# Patient Record
Sex: FEMALE | Race: WHITE | Employment: UNEMPLOYED | URBAN - METROPOLITAN AREA
[De-identification: names, ages, dates, MRNs, and addresses within clinical notes are randomized per-mention and may not be internally consistent; named-entity substitution may affect disease eponyms.]

---

## 2017-10-17 ENCOUNTER — OP HISTORICAL/CONVERTED ENCOUNTER (OUTPATIENT)
Dept: OTHER | Age: 32
End: 2017-10-17

## 2018-02-12 ENCOUNTER — OP HISTORICAL/CONVERTED ENCOUNTER (OUTPATIENT)
Dept: OTHER | Age: 33
End: 2018-02-12

## 2018-02-23 ENCOUNTER — OP HISTORICAL/CONVERTED ENCOUNTER (OUTPATIENT)
Dept: OTHER | Age: 33
End: 2018-02-23

## 2018-03-02 ENCOUNTER — OP HISTORICAL/CONVERTED ENCOUNTER (OUTPATIENT)
Dept: OTHER | Age: 33
End: 2018-03-02

## 2018-04-02 ENCOUNTER — OP HISTORICAL/CONVERTED ENCOUNTER (OUTPATIENT)
Dept: OTHER | Age: 33
End: 2018-04-02

## 2018-05-05 ENCOUNTER — IP HISTORICAL/CONVERTED ENCOUNTER (OUTPATIENT)
Dept: OTHER | Age: 33
End: 2018-05-05

## 2019-06-16 ENCOUNTER — ED HISTORICAL/CONVERTED ENCOUNTER (OUTPATIENT)
Dept: OTHER | Age: 34
End: 2019-06-16

## 2019-12-08 ENCOUNTER — IP HISTORICAL/CONVERTED ENCOUNTER (OUTPATIENT)
Dept: OTHER | Age: 34
End: 2019-12-08

## 2020-01-31 ENCOUNTER — OP HISTORICAL/CONVERTED ENCOUNTER (OUTPATIENT)
Dept: OTHER | Age: 35
End: 2020-01-31

## 2020-06-03 ENCOUNTER — OP HISTORICAL/CONVERTED ENCOUNTER (OUTPATIENT)
Dept: OTHER | Age: 35
End: 2020-06-03

## 2020-07-01 ENCOUNTER — OP HISTORICAL/CONVERTED ENCOUNTER (OUTPATIENT)
Dept: OTHER | Age: 35
End: 2020-07-01

## 2020-07-02 PROBLEM — R94.6 THYROID FUNCTION TEST ABNORMAL: Status: ACTIVE | Noted: 2020-07-02

## 2020-07-02 PROBLEM — R53.83 FATIGUE: Status: ACTIVE | Noted: 2020-07-02

## 2020-07-02 PROBLEM — E05.90 SUBCLINICAL HYPERTHYROIDISM: Status: ACTIVE | Noted: 2020-07-02

## 2020-07-02 RX ORDER — METHIMAZOLE 5 MG/1
TABLET ORAL
COMMUNITY
End: 2020-07-23 | Stop reason: SDUPTHER

## 2020-07-02 RX ORDER — FLUCONAZOLE 150 MG/1
150 TABLET ORAL DAILY
COMMUNITY
End: 2020-07-23 | Stop reason: ALTCHOICE

## 2020-07-02 RX ORDER — ONDANSETRON 4 MG/1
4 TABLET, FILM COATED ORAL
COMMUNITY
End: 2020-07-23 | Stop reason: ALTCHOICE

## 2020-07-02 RX ORDER — OSELTAMIVIR PHOSPHATE 75 MG/1
CAPSULE ORAL
COMMUNITY
End: 2020-07-23 | Stop reason: ALTCHOICE

## 2020-07-02 RX ORDER — IBUPROFEN 200 MG
TABLET ORAL
COMMUNITY
End: 2020-07-23 | Stop reason: ALTCHOICE

## 2020-07-02 RX ORDER — CYCLOBENZAPRINE HCL 10 MG
TABLET ORAL
COMMUNITY
End: 2020-07-23 | Stop reason: ALTCHOICE

## 2020-07-23 ENCOUNTER — VIRTUAL VISIT (OUTPATIENT)
Dept: ENDOCRINOLOGY | Age: 35
End: 2020-07-23
Payer: MEDICAID

## 2020-07-23 DIAGNOSIS — E05.90 HYPERTHYROIDISM: Primary | ICD-10-CM

## 2020-07-23 PROBLEM — R94.6 THYROID FUNCTION TEST ABNORMAL: Status: RESOLVED | Noted: 2020-07-02 | Resolved: 2020-07-23

## 2020-07-23 PROCEDURE — 99213 OFFICE O/P EST LOW 20 MIN: CPT | Performed by: INTERNAL MEDICINE

## 2020-07-23 RX ORDER — METHIMAZOLE 5 MG/1
5 TABLET ORAL DAILY
Qty: 30 TAB | Refills: 4 | Status: SHIPPED | OUTPATIENT
Start: 2020-07-23 | End: 2020-08-22

## 2020-07-23 NOTE — PROGRESS NOTES
History and Physical    Patient: German Pérez MRN: [id number]  SSN: [id number]    YOB: 1985  Age: 30 y.o. Sex: female      Subjective:    German Pérez, who was evaluated through a synchronous (real-time) audio-video encounter, and/or her healthcare decision maker, is aware that it is a billable service, with coverage as determined by her insurance carrier. She provided verbal consent to proceed: Yes, and patient identification was verified. It was conducted pursuant to the emergency declaration under the [facility], [address] [place] [address] authority and the [employer] and [employer] Act. A caregiver was present when appropriate. Ability to conduct physical exam was limited. I was in the office. The patient was at home. German Pérez is a 30 y.o. female  with no significant past medical history is here for follow-up of abnormal thyroid function test/subclinical hyperthyroidism. She was initially sent to me by Saleem Ferraro Grandchild. She does not have a primary care physician. Patient is almost 7 months postpartum. After the initial visit patient had complete thyroid function test which continued to show subclinical hyperthyroidism. Thyroid ultrasound showed some subcentimeter nodules and uptake scan showed increased uptake but there was no clear hot or cold nodule. At the last visit she was started on methimazole 5 mg daily. She is taking this regularly and tolerating it well. She had labs done prior to this visit. Symptom wise, she is feeling better in terms of improved appetite, not as fatigued as before, does not have palpitations anymore.     Status post bilateral tubal ligation     Initial history:  When she went to OB/GYN she had complaints including decreased appetite, difficulty in maintaining her weight, she has been around 125 pounds or her life but it has been difficult for her to maintain her weight because she does not feel hungry. She has chronic fatigue, cold intolerance, hair fall. She has intermittent palpitations which she never had evaluated. Bowel movements are regular. Since she is postpartum, menses are still not regular. She is currently not breast-feeding, but she was able to breast-feed for first 5 to 6 weeks. Skin is dry. She does not use any over-the-counter medications or supplements. She is also experiencing some moodiness. Symptoms: As above    Prior history of thyroid problems: no    Recent steroid treatments: no    High dose Biotin supplemnts: no    Recent URI: Flu in 12/2019    Tenderness in neck: no    Swelling in neck: no    Family history of thyroid problems: no    Personal/family history of autoimmune diseases: no    smoking: yes    Change in appearance of eyes/ redness/ eye irritation: dry eyes and swollen that she attributes to allergies    Personal history of cardiac disease: no    Personal history of osteoporosis/fragility fractures: no    Past Medical History:   Diagnosis Date    Fatigue 7/2/2020    Subclinical hyperthyroidism 7/2/2020    Thyroid function test abnormal 7/2/2020     Past Surgical History:   Procedure Laterality Date    HX TUBAL LIGATION        Family History   Problem Relation Age of Onset    Hypertension Father      Social History     Tobacco Use    Smoking status: Current Every Day Smoker     Packs/day: 0.25    Smokeless tobacco: Never Used   Substance Use Topics    Alcohol use: Not Currently      Prior to Admission medications    Medication Sig Start Date End Date Taking? Authorizing Provider   cyclobenzaprine (FLEXERIL) 10 mg tablet Take  by mouth three (3) times daily as needed for Muscle Spasm(s). Provider, Historical   fluconazole (DIFLUCAN) 150 mg tablet Take 150 mg by mouth daily. FDA advises cautious prescribing of oral fluconazole in pregnancy. Provider, Historical   ibuprofen (MOTRIN) 200 mg tablet Take  by mouth. Provider, Historical   methIMAzole (TAPAZOLE) 5 mg tablet Take  by mouth. Provider, Historical   ondansetron hcl (ZOFRAN) 4 mg tablet Take 4 mg by mouth every eight (8) hours as needed for Nausea or Vomiting. Provider, Historical   oseltamivir (TAMIFLU) 75 mg capsule Take  by mouth. Provider, Historical        Allergies   Allergen Reactions    Advil [Ibuprofen] Unknown (comments)    Amoxicillin Unknown (comments)    Penicillins Unknown (comments)    Tylenol [Acetaminophen] Unknown (comments)       Review of Systems:  ROS    A comprehensive review of systems was preformed and it is negative except mentioned in HPI    Objective: There were no vitals filed for this visit. Physical Exam:    Physical Exam  General: Awake, alert, oriented x3  HEENT: Normocephalic, atraumatic  Respiratory: No dyspnea    Labs and Imagin2020:  TSH normal at 2.62  Free T4 normal at 0.87  Normal CBC and hepatic function test    There were no vitals filed for this visit. No results found for: HBA1C, HGBE8, KCB0LJPV, UXB5INHI     Assessment:     Patient Active Problem List   Diagnosis Code    Fatigue R53.83    Subclinical hyperthyroidism E05.90    Thyroid function test abnormal R94.6           Plan:     subclinical hyperthyroidism   2020:    TSH suppressed at 0.042 (0.454. 5)    Free T4 normal at 1.58 (0.821.77)    Normal prolactin, FSH and LH        2020:  TSH suppressed at 0.053  Free T4 normal at 0.87  Total T3 normal at 80  TSI, TRAB, TPO negative        358781 thyroid ultrasound:  Bilateral subcentimeter thyroid nodules, largest is 7 mm on the left side. Isoechoic oval-shaped isthmus nodule 16 x 6 mm, benign-appearing        Thyroid uptake scan 2020:  24-hour uptake 39%, no hot or cold nodules identified. Started methimazole 5 mg daily on .    2020:  TSH normal at 2.62  Normal free T4  Normal WBC and hepatic function test  Clinically better.   Plan:  Continue methimazole 5 mg daily. Check labs in 4 months, a couple days before next visit. Time spent with patient: 15 minutes with more than 50% time spent face-to-face in counseling and patient education. fatigue   4-:  Normal CBC, CMP. Ferritin was low. Advised patient to start taking over-the-counter iron tablets. No orders of the defined types were placed in this encounter.        Signed By: Xiomara Duenas MD     July 23, 2020      Return to clinic 4-month

## 2020-08-01 ENCOUNTER — OP HISTORICAL/CONVERTED ENCOUNTER (OUTPATIENT)
Dept: OTHER | Age: 35
End: 2020-08-01

## 2020-09-01 ENCOUNTER — OP HISTORICAL/CONVERTED ENCOUNTER (OUTPATIENT)
Dept: OTHER | Age: 35
End: 2020-09-01

## 2020-10-01 ENCOUNTER — OP HISTORICAL/CONVERTED ENCOUNTER (OUTPATIENT)
Dept: OTHER | Age: 35
End: 2020-10-01

## 2020-10-23 DIAGNOSIS — E05.90 HYPERTHYROIDISM: ICD-10-CM

## 2021-01-15 LAB
T4 FREE SERPL-MCNC: 1.11 NG/DL (ref 0.82–1.77)
TSH SERPL DL<=0.005 MIU/L-ACNC: 0.96 UIU/ML (ref 0.45–4.5)

## 2021-01-19 ENCOUNTER — OFFICE VISIT (OUTPATIENT)
Dept: ENDOCRINOLOGY | Age: 36
End: 2021-01-19
Payer: MEDICAID

## 2021-01-19 VITALS
WEIGHT: 118.7 LBS | SYSTOLIC BLOOD PRESSURE: 111 MMHG | TEMPERATURE: 98.6 F | BODY MASS INDEX: 19.08 KG/M2 | HEIGHT: 66 IN | OXYGEN SATURATION: 98 % | HEART RATE: 63 BPM | DIASTOLIC BLOOD PRESSURE: 72 MMHG

## 2021-01-19 DIAGNOSIS — E05.90 HYPERTHYROIDISM: Primary | ICD-10-CM

## 2021-01-19 PROCEDURE — 99213 OFFICE O/P EST LOW 20 MIN: CPT | Performed by: INTERNAL MEDICINE

## 2021-01-19 RX ORDER — METHIMAZOLE 5 MG/1
TABLET ORAL
COMMUNITY
Start: 2020-10-19 | End: 2021-01-19 | Stop reason: SDUPTHER

## 2021-01-19 RX ORDER — METHIMAZOLE 5 MG/1
5 TABLET ORAL DAILY
Qty: 30 TAB | Refills: 5 | Status: SHIPPED | OUTPATIENT
Start: 2021-01-19 | End: 2021-02-18

## 2021-01-19 NOTE — PROGRESS NOTES
History and Physical    Patient: Vahe Fischer MRN: [id number]  SSN: [id number]    YOB: 1985  Age: 30 y.o. Sex: female      Subjective:     Vahe Fischer is a 30 y.o. female  with no significant past medical history is here for follow-up of abnormal thyroid function test/subclinical hyperthyroidism. She was initially sent to me by Trenton Rakes Dr. Garrel Dance. She does not have a primary care physician. Patient is almost 7 months postpartum. After the initial visit patient had complete thyroid function test which continued to show subclinical hyperthyroidism. Thyroid ultrasound showed some subcentimeter nodules and uptake scan showed increased uptake but there was no clear hot or cold nodule. she was started on methimazole 5 mg daily. She is taking this regularly and tolerating it well. She had labs done prior to this visit. Symptom wise, she is feeling better in terms of improved appetite, not as fatigued as before, has palpitations on and off but not as frequently as before. Her weight is being stable. She started taking iron tablets over-the-counter since the last visit. Status post bilateral tubal ligation     Initial history:  When she went to OB/GYN she had complaints including decreased appetite, difficulty in maintaining her weight, she has been around 125 pounds or her life but it has been difficult for her to maintain her weight because she does not feel hungry. She has chronic fatigue, cold intolerance, hair fall. She has intermittent palpitations which she never had evaluated. Bowel movements are regular. Since she is postpartum, menses are still not regular. She is currently not breast-feeding, but she was able to breast-feed for first 5 to 6 weeks. Skin is dry. She does not use any over-the-counter medications or supplements. She is also experiencing some moodiness.     Symptoms: As above    Prior history of thyroid problems: no    Recent steroid treatments: no    High dose Biotin supplemnts: no    Recent URI: Flu in 12/2019    Tenderness in neck: no    Swelling in neck: no    Family history of thyroid problems: no    Personal/family history of autoimmune diseases: no    smoking: yes    Change in appearance of eyes/ redness/ eye irritation: dry eyes and swollen that she attributes to allergies    Personal history of cardiac disease: no    Personal history of osteoporosis/fragility fractures: no    Past Medical History:   Diagnosis Date    Fatigue 7/2/2020    Subclinical hyperthyroidism 7/2/2020    Thyroid function test abnormal 7/2/2020     Past Surgical History:   Procedure Laterality Date    HX TUBAL LIGATION        Family History   Problem Relation Age of Onset    Hypertension Father      Social History     Tobacco Use    Smoking status: Current Every Day Smoker     Packs/day: 0.25    Smokeless tobacco: Never Used   Substance Use Topics    Alcohol use: Not Currently      Prior to Admission medications    Medication Sig Start Date End Date Taking? Authorizing Provider   methIMAzole (TAPAZOLE) 5 mg tablet Take 1 Tab by mouth daily for 30 days. 1/19/21 2/18/21 Yes Mary Anne Tiwari MD        Allergies   Allergen Reactions    Advil [Ibuprofen] Unknown (comments)    Amoxicillin Unknown (comments)    Hydrocodone Rash    Nsaids (Non-Steroidal Anti-Inflammatory Drug) Itching    Penicillins Unknown (comments)    Tylenol [Acetaminophen] Unknown (comments)       Review of Systems:  ROS    A comprehensive review of systems was preformed and it is negative except mentioned in HPI    Objective:     Vitals:    01/19/21 1037   BP: 111/72   Pulse: 63   Temp: 98.6 °F (37 °C)   TempSrc: Temporal   SpO2: 98%   Weight: 118 lb 11.2 oz (53.8 kg)   Height: 5' 6\" (1.676 m)        Physical Exam:    Physical Exam  Vitals signs and nursing note reviewed. Constitutional:       Appearance: Normal appearance. HENT:      Head: Normocephalic and atraumatic. Cardiovascular:      Rate and Rhythm: Normal rate and regular rhythm. Pulmonary:      Effort: Pulmonary effort is normal.      Breath sounds: Normal breath sounds. Neurological:      Mental Status: She is alert. Labs and Imagin2020:  TSH normal at 2.62  Free T4 normal at 0.87  Normal CBC and hepatic function test  Results for Valeria Chavarria (MRN [id number]) as of 2021 10:30   Ref. Range 2021 10:08   T4, Free Latest Ref Range: 0.82 - 1.77 ng/dL 1.11   TSH Latest Ref Range: 0.450 - 4.500 uIU/mL 0.963     Last 3 Recorded Weights in this Encounter    21 1037   Weight: 118 lb 11.2 oz (53.8 kg)        No results found for: HBA1C, HGBE8, YYA6VJRX, EHI0JNLF, GWN4SSYG     Assessment:     Patient Active Problem List   Diagnosis Code    Fatigue R53.83    Subclinical hyperthyroidism E05.90           Plan:     subclinical hyperthyroidism   2020:    TSH suppressed at 0.042 (0.454. 5)    Free T4 normal at 1.58 (0.821.77)    Normal prolactin, FSH and LH        2020:  TSH suppressed at 0.053  Free T4 normal at 0.87  Total T3 normal at 80  TSI, TRAB, TPO negative    458257 thyroid ultrasound:  Bilateral subcentimeter thyroid nodules, largest is 7 mm on the left side. Isoechoic oval-shaped isthmus nodule 16 x 6 mm, benign-appearing    Thyroid uptake scan 2020:  24-hour uptake 39%, no hot or cold nodules identified. Started methimazole 5 mg daily on .    2020:  TSH normal at 2.62  Normal free T4  Normal WBC and hepatic function test  Clinically better. 2021:  TSH normal at 0.963  Free T4 normal at 1.11  Plan:  Continue methimazole 5 mg daily. Check labs in 6 months, prior to next visit. fatigue   2020:  Normal CBC, CMP. Ferritin was low. Patient has started taking iron tablets over-the-counter.     Orders Placed This Encounter    TSH AND FREE T4     Standing Status:   Future     Standing Expiration Date:   2021    methIMAzole (TAPAZOLE) 5 mg tablet     Sig: Take 1 Tab by mouth daily for 30 days.      Dispense:  30 Tab     Refill:  5        Signed By: Bacilio Sandra MD     January 19, 2021      Return to clinic 6-month

## 2021-07-19 DIAGNOSIS — E05.90 HYPERTHYROIDISM: ICD-10-CM

## 2021-08-09 ENCOUNTER — OFFICE VISIT (OUTPATIENT)
Dept: OBGYN CLINIC | Age: 36
End: 2021-08-09
Payer: MEDICAID

## 2021-08-09 VITALS
SYSTOLIC BLOOD PRESSURE: 120 MMHG | HEIGHT: 66 IN | OXYGEN SATURATION: 98 % | HEART RATE: 69 BPM | BODY MASS INDEX: 18.48 KG/M2 | DIASTOLIC BLOOD PRESSURE: 78 MMHG | WEIGHT: 115 LBS | RESPIRATION RATE: 16 BRPM

## 2021-08-09 DIAGNOSIS — N63.20 LEFT BREAST MASS: Primary | ICD-10-CM

## 2021-08-09 PROCEDURE — 99214 OFFICE O/P EST MOD 30 MIN: CPT | Performed by: OBSTETRICS & GYNECOLOGY

## 2021-08-11 ENCOUNTER — TELEPHONE (OUTPATIENT)
Dept: OBGYN CLINIC | Age: 36
End: 2021-08-11

## 2021-08-11 NOTE — TELEPHONE ENCOUNTER
Received a call from [facility] with [facility] authorization dept requesting that Dr Swetha Arroyo finish the patient's note. States the mammogram ordered will need a prior auth from her insurance and the note will need to be faxed to her insurance. Spoke with Dr Swetha Arroyo and she states she will finish the note tonight. [facility] made aware.

## 2021-08-11 NOTE — PROGRESS NOTES
Shannan Baker is a 29 y.o. female, No obstetric history on file., Patient's last menstrual period was 08/05/2021., who presents today for the following:  Chief Complaint   Patient presents with    Breast Problem     Pt c/o lump in L breast.        Allergies   Allergen Reactions    Advil [Ibuprofen] Unknown (comments)    Amoxicillin Unknown (comments)    Hydrocodone Rash    Nsaids (Non-Steroidal Anti-Inflammatory Drug) Itching    Penicillins Unknown (comments)    Tylenol [Acetaminophen] Unknown (comments)       No current outpatient medications on file. No current facility-administered medications for this visit. Past Medical History:   Diagnosis Date    Fatigue 7/2/2020    Subclinical hyperthyroidism 7/2/2020    Thyroid function test abnormal 7/2/2020       Past Surgical History:   Procedure Laterality Date    HX TUBAL LIGATION         Family History   Problem Relation Age of Onset    Hypertension Father        Social History     Socioeconomic History    Marital status:      Spouse name: Not on file    Number of children: Not on file    Years of education: Not on file    Highest education level: Not on file   Occupational History    Not on file   Tobacco Use    Smoking status: Current Every Day Smoker     Packs/day: 0.25    Smokeless tobacco: Never Used   Vaping Use    Vaping Use: Never used   Substance and Sexual Activity    Alcohol use: Not Currently    Drug use: Never    Sexual activity: Not on file   Other Topics Concern    Not on file   Social History Narrative    Not on file     Social Determinants of Health     Financial Resource Strain:     Difficulty of Paying Living Expenses:    Food Insecurity:     Worried About Running Out of Food in the Last Year:     Viola of Food in the Last Year:    Transportation Needs:     Lack of Transportation (Medical):      Lack of Transportation (Non-Medical):    Physical Activity:     Days of Exercise per Week:     Minutes of Exercise per Session:    Stress:     Feeling of Stress :    Social Connections:     Frequency of Communication with Friends and Family:     Frequency of Social Gatherings with Friends and Family:     Attends Church Services:     Active Member of Clubs or Organizations:     Attends Club or Organization Meetings:     Marital Status:    Intimate Partner Violence:     Fear of Current or Ex-Partner:     Emotionally Abused:     Physically Abused:     Sexually Abused:          HPI  Patient presents for evaluation  Upon self breast exam , palpated left outer breast mass. Reports mass has been present > 2 weeks  No pain  No skin changes   No nipple drainage  No palpable masses in the right breast.    Review of Systems   Constitutional: Negative. Respiratory: Negative. Cardiovascular: Negative. Gastrointestinal: Negative. Genitourinary: Negative. Musculoskeletal: Negative. Skin: Negative. Neurological: Negative. Endo/Heme/Allergies: Negative. Psychiatric/Behavioral: Negative. All other systems reviewed and are negative. /78 (BP 1 Location: Right arm, BP Patient Position: Sitting)   Pulse 69   Resp 16   Ht 5' 6\" (1.676 m)   Wt 115 lb (52.2 kg)   LMP 08/05/2021   SpO2 98%   BMI 18.56 kg/m²    OBGyn Exam   Constitutional:     General Appearance: healthy-appearing, well-nourished, and well-developed; Level of Distress: NAD. Ambulation: ambulating normally. Psychiatric:   Insight: good judgement. Mental Status: normal mood and affect and active and alert. Orientation: to time, place, and person. Memory: recent memory normal and remote memory normal.     Head: Head: normocephalic and atraumatic. Neck:   Neck: supple, FROM, trachea midline, and no masses. Lymph Nodes: no cervical LAD, supraclavicular LAD, axillary LAD, or inguinal LAD. Thyroid: no enlargement or nodules and non-tender. Lungs:   Respiratory effort: no dyspnea.    Auscultation: no wheezing, rales/crackles, or rhonchi and breath sounds normal, good air movement, and CTA except as noted. Cardiovascular:   Heart Auscultation: normal S1 and S2; no murmurs, rubs, or gallops; and RRR. Breast: Breast: no abnormal secretions and normal appearance. , palpable mass in the left upper outer quadrant, no skin retraction, no nipple drainage, non tender    Abdomen:   no tenderness, guarding, masses, rebound tenderness, or CVA tenderness and non-distended. Skin:   Inspection and palpation: no rash, lesions, ulcer, induration,        1. Left breast mass    - US BREAST LT LIMITED=<3 QUAD; Future  - [facility] 3D MALCOLM W MAMMO BI DX INCL CAD;  Future

## 2021-08-11 NOTE — TELEPHONE ENCOUNTER
Spoke with Jacklyn Jenkins in the prior auth department at [facility] and advised her the patient's note has been finished.

## 2021-08-13 ENCOUNTER — HOSPITAL ENCOUNTER (OUTPATIENT)
Dept: MAMMOGRAPHY | Age: 36
Discharge: HOME | End: 2021-08-13
Attending: OBSTETRICS & GYNECOLOGY
Payer: MEDICAID

## 2021-08-13 DIAGNOSIS — N63.20 LEFT BREAST MASS: ICD-10-CM

## 2021-08-13 PROCEDURE — 76642 ULTRASOUND BREAST LIMITED: CPT

## 2021-08-13 PROCEDURE — 77062 BREAST TOMOSYNTHESIS BI: CPT

## 2022-03-18 PROBLEM — R53.83 FATIGUE: Status: ACTIVE | Noted: 2020-07-02

## 2022-03-20 PROBLEM — E05.90 SUBCLINICAL HYPERTHYROIDISM: Status: ACTIVE | Noted: 2020-07-02

## 2024-03-29 ENCOUNTER — OFFICE VISIT (OUTPATIENT)
Age: 39
End: 2024-03-29
Payer: MEDICAID

## 2024-03-29 VITALS
DIASTOLIC BLOOD PRESSURE: 68 MMHG | BODY MASS INDEX: 19.77 KG/M2 | OXYGEN SATURATION: 98 % | SYSTOLIC BLOOD PRESSURE: 115 MMHG | HEIGHT: 66 IN | HEART RATE: 68 BPM | RESPIRATION RATE: 18 BRPM | WEIGHT: 123 LBS

## 2024-03-29 DIAGNOSIS — Z01.419 GYNECOLOGIC EXAM NORMAL: Primary | ICD-10-CM

## 2024-03-29 DIAGNOSIS — Z12.4 ENCOUNTER FOR SCREENING FOR MALIGNANT NEOPLASM OF CERVIX: ICD-10-CM

## 2024-03-29 PROCEDURE — 99385 PREV VISIT NEW AGE 18-39: CPT | Performed by: OBSTETRICS & GYNECOLOGY

## 2024-03-29 NOTE — PROGRESS NOTES
Anny Lara is a 38 y.o. female, , Patient's last menstrual period was 2024., who presents today for the following:  Chief Complaint   Patient presents with    Annual Exam        Allergies   Allergen Reactions    Acetaminophen      Other reaction(s): Unknown (comments)    Amoxicillin      Other reaction(s): Unknown (comments)    Ibuprofen      Other reaction(s): Unknown (comments)    Nsaids Itching    Penicillins      Other reaction(s): Unknown (comments)    Hydrocodone Rash       No current outpatient medications on file.     No current facility-administered medications for this visit.         Past Medical History:   Diagnosis Date    Fatigue 2020    Subclinical hyperthyroidism 2020    Thyroid function test abnormal 2020       Past Surgical History:   Procedure Laterality Date    TUBAL LIGATION         Family History   Problem Relation Age of Onset    Hypertension Father        Social History     Socioeconomic History    Marital status:      Spouse name: Not on file    Number of children: Not on file    Years of education: Not on file    Highest education level: Not on file   Occupational History    Not on file   Tobacco Use    Smoking status: Every Day     Current packs/day: 0.25     Types: Cigarettes    Smokeless tobacco: Never   Substance and Sexual Activity    Alcohol use: Not Currently    Drug use: Never    Sexual activity: Not on file   Other Topics Concern    Not on file   Social History Narrative    Not on file     Social Determinants of Health     Financial Resource Strain: Not on file   Food Insecurity: Not on file   Transportation Needs: Not on file   Physical Activity: Not on file   Stress: Not on file   Social Connections: Not on file   Intimate Partner Violence: Not on file   Housing Stability: Not on file         HPI  Annual    Review of Systems   All other systems reviewed and are negative.       /68 (Site: Right Upper Arm, Position: Sitting)   Pulse 68

## 2024-04-05 LAB
., LABCORP: ABNORMAL
CYTOLOGIST CVX/VAG CYTO: ABNORMAL
CYTOLOGY CVX/VAG DOC CYTO: ABNORMAL
CYTOLOGY CVX/VAG DOC THIN PREP: ABNORMAL
DX ICD CODE: ABNORMAL
DX ICD CODE: ABNORMAL
HPV I/H RISK 4 DNA CVX QL PROBE+SIG AMP: NEGATIVE
Lab: ABNORMAL
OTHER STN SPEC: ABNORMAL
PATHOLOGIST CVX/VAG CYTO: ABNORMAL
RECOM F/U CVX/VAG CYTO: ABNORMAL
STAT OF ADQ CVX/VAG CYTO-IMP: ABNORMAL

## 2024-04-09 ENCOUNTER — TELEPHONE (OUTPATIENT)
Age: 39
End: 2024-04-09

## 2024-04-09 NOTE — TELEPHONE ENCOUNTER
Informed patient of her abnormal pap results, scheduled her for Colposcopy for 4/29. Informed patient that we would let her know which location it would be at.

## 2024-04-29 ENCOUNTER — PROCEDURE VISIT (OUTPATIENT)
Age: 39
End: 2024-04-29
Payer: MEDICAID

## 2024-04-29 VITALS
BODY MASS INDEX: 19.93 KG/M2 | DIASTOLIC BLOOD PRESSURE: 74 MMHG | SYSTOLIC BLOOD PRESSURE: 126 MMHG | WEIGHT: 124 LBS | HEIGHT: 66 IN

## 2024-04-29 DIAGNOSIS — R87.810 ASCUS WITH POSITIVE HIGH RISK HPV CERVICAL: Primary | ICD-10-CM

## 2024-04-29 DIAGNOSIS — R87.610 ASCUS WITH POSITIVE HIGH RISK HPV CERVICAL: Primary | ICD-10-CM

## 2024-04-29 DIAGNOSIS — N89.8 VAGINAL DISCHARGE: ICD-10-CM

## 2024-04-29 PROCEDURE — 57456 ENDOCERV CURETTAGE W/SCOPE: CPT | Performed by: OBSTETRICS & GYNECOLOGY

## 2024-04-29 RX ORDER — FLUCONAZOLE 150 MG/1
150 TABLET ORAL ONCE
Qty: 1 TABLET | Refills: 0 | Status: SHIPPED | OUTPATIENT
Start: 2024-04-29 | End: 2024-04-29

## 2024-04-30 NOTE — PROGRESS NOTES
Anny Lara is a 38 y.o. female, , No LMP recorded., who presents today for the following:  Chief Complaint   Patient presents with    Colposcopy     Ascus        Allergies   Allergen Reactions    Acetaminophen      Other reaction(s): Unknown (comments)    Amoxicillin      Other reaction(s): Unknown (comments)    Ibuprofen      Other reaction(s): Unknown (comments)    Nsaids Itching    Penicillins      Other reaction(s): Unknown (comments)    Hydrocodone Rash       No current outpatient medications on file.     No current facility-administered medications for this visit.         Past Medical History:   Diagnosis Date    Fatigue 2020    Subclinical hyperthyroidism 2020    Thyroid function test abnormal 2020       Past Surgical History:   Procedure Laterality Date    TUBAL LIGATION         Family History   Problem Relation Age of Onset    Hypertension Father        Social History     Socioeconomic History    Marital status:      Spouse name: Not on file    Number of children: Not on file    Years of education: Not on file    Highest education level: Not on file   Occupational History    Not on file   Tobacco Use    Smoking status: Every Day     Current packs/day: 0.25     Types: Cigarettes    Smokeless tobacco: Never   Substance and Sexual Activity    Alcohol use: Not Currently    Drug use: Never    Sexual activity: Not on file   Other Topics Concern    Not on file   Social History Narrative    Not on file     Social Determinants of Health     Financial Resource Strain: Not on file   Food Insecurity: Not on file   Transportation Needs: Not on file   Physical Activity: Not on file   Stress: Not on file   Social Connections: Not on file   Intimate Partner Violence: Not on file   Housing Stability: Not on file         HPI  Here today for colposcopy  ASCUS   Consent obtained    Review of Systems   All other systems reviewed and are negative.       /74 (Site: Right Upper Arm, Position:

## 2024-05-01 LAB
CPT BILLING CODE: NORMAL
DIAGNOSIS SYNOPSIS:: NORMAL
DX ICD CODE: NORMAL
PATH REPORT.FINAL DX SPEC: NORMAL
PATH REPORT.GROSS SPEC: NORMAL
PATH REPORT.RELEVANT HX SPEC: NORMAL
PATH REPORT.SITE OF ORIGIN SPEC: NORMAL
PATHOLOGIST NAME: NORMAL
PAYMENT PROCEDURE: NORMAL

## 2024-05-10 ENCOUNTER — TELEPHONE (OUTPATIENT)
Age: 39
End: 2024-05-10

## 2024-05-10 NOTE — TELEPHONE ENCOUNTER
Patient contacted the office reports that she would like to know the results of her testing.  Advised provider not in the office and will return on Monday.  She is requesting to have another provider review so she can determine if she wants to see if it is necessary for her to come on Monday. Looks like she had a colposcopy and has 2 week follow-up scheduled for Monday.  Please review and advise.

## 2024-05-10 NOTE — TELEPHONE ENCOUNTER
Returned call to patient and advised per Dr. Pino seems like endocervical curettage findings are benign, nonconcerning, negative for malignancy.

## 2024-08-29 ENCOUNTER — HOSPITAL ENCOUNTER (OUTPATIENT)
Facility: HOSPITAL | Age: 39
Discharge: HOME | End: 2024-08-29
Attending: ORTHOPAEDIC SURGERY
Payer: MEDICAID

## 2024-08-29 DIAGNOSIS — M50.30 DDD (DEGENERATIVE DISC DISEASE), CERVICAL: ICD-10-CM

## 2024-08-29 DIAGNOSIS — M54.2 CERVICAL PAIN: ICD-10-CM

## 2024-08-29 PROCEDURE — 72141 MRI NECK SPINE W/O DYE: CPT

## 2024-12-30 ENCOUNTER — HOSPITAL ENCOUNTER (OUTPATIENT)
Facility: HOSPITAL | Age: 39
Discharge: HOME | End: 2025-01-02
Payer: MEDICAID

## 2024-12-30 VITALS
WEIGHT: 129.8 LBS | BODY MASS INDEX: 20.86 KG/M2 | OXYGEN SATURATION: 99 % | HEIGHT: 66 IN | RESPIRATION RATE: 16 BRPM | TEMPERATURE: 97.1 F | HEART RATE: 64 BPM | DIASTOLIC BLOOD PRESSURE: 76 MMHG | SYSTOLIC BLOOD PRESSURE: 116 MMHG

## 2024-12-30 LAB
25(OH)D3 SERPL-MCNC: 26.9 NG/ML (ref 30–100)
ABO + RH BLD: NORMAL
ALBUMIN SERPL-MCNC: 4.5 G/DL (ref 3.5–5)
ALBUMIN/GLOB SERPL: 1.3 (ref 1.1–2.2)
ALP SERPL-CCNC: 50 U/L (ref 45–117)
ALT SERPL-CCNC: 19 U/L (ref 12–78)
ANION GAP SERPL CALC-SCNC: 5 MMOL/L (ref 2–12)
APPEARANCE UR: CLEAR
APTT PPP: 27.5 SEC (ref 22.1–31)
AST SERPL-CCNC: 12 U/L (ref 15–37)
BACTERIA URNS QL MICRO: NEGATIVE /HPF
BASOPHILS # BLD: 0 K/UL (ref 0–0.1)
BASOPHILS NFR BLD: 0 % (ref 0–1)
BILIRUB SERPL-MCNC: 1.2 MG/DL (ref 0.2–1)
BILIRUB UR QL: NEGATIVE
BLOOD GROUP ANTIBODIES SERPL: NORMAL
BUN SERPL-MCNC: 9 MG/DL (ref 6–20)
BUN/CREAT SERPL: 11 (ref 12–20)
CALCIUM SERPL-MCNC: 8.8 MG/DL (ref 8.5–10.1)
CHLORIDE SERPL-SCNC: 106 MMOL/L (ref 97–108)
CO2 SERPL-SCNC: 26 MMOL/L (ref 21–32)
COLOR UR: NORMAL
CREAT SERPL-MCNC: 0.85 MG/DL (ref 0.55–1.02)
DIFFERENTIAL METHOD BLD: NORMAL
EKG ATRIAL RATE: 68 BPM
EKG DIAGNOSIS: NORMAL
EKG P AXIS: 25 DEGREES
EKG P-R INTERVAL: 128 MS
EKG Q-T INTERVAL: 416 MS
EKG QRS DURATION: 88 MS
EKG QTC CALCULATION (BAZETT): 442 MS
EKG R AXIS: 41 DEGREES
EKG T AXIS: 33 DEGREES
EKG VENTRICULAR RATE: 68 BPM
EOSINOPHIL # BLD: 0.1 K/UL (ref 0–0.4)
EOSINOPHIL NFR BLD: 2 % (ref 0–7)
EPITH CASTS URNS QL MICRO: NORMAL /LPF
ERYTHROCYTE [DISTWIDTH] IN BLOOD BY AUTOMATED COUNT: 12.3 % (ref 11.5–14.5)
EST. AVERAGE GLUCOSE BLD GHB EST-MCNC: 105 MG/DL
GLOBULIN SER CALC-MCNC: 3.5 G/DL (ref 2–4)
GLUCOSE SERPL-MCNC: 82 MG/DL (ref 65–100)
GLUCOSE UR STRIP.AUTO-MCNC: NEGATIVE MG/DL
HBA1C MFR BLD: 5.3 % (ref 4–5.6)
HCT VFR BLD AUTO: 43.1 % (ref 35–47)
HGB BLD-MCNC: 14.4 G/DL (ref 11.5–16)
HGB UR QL STRIP: NEGATIVE
HYALINE CASTS URNS QL MICRO: NORMAL /LPF (ref 0–2)
IMM GRANULOCYTES # BLD AUTO: 0 K/UL (ref 0–0.04)
IMM GRANULOCYTES NFR BLD AUTO: 0 % (ref 0–0.5)
INR PPP: 1 (ref 0.9–1.1)
KETONES UR QL STRIP.AUTO: NEGATIVE MG/DL
LEUKOCYTE ESTERASE UR QL STRIP.AUTO: NEGATIVE
LYMPHOCYTES # BLD: 1.9 K/UL (ref 0.8–3.5)
LYMPHOCYTES NFR BLD: 31 % (ref 12–49)
MCH RBC QN AUTO: 31.2 PG (ref 26–34)
MCHC RBC AUTO-ENTMCNC: 33.4 G/DL (ref 30–36.5)
MCV RBC AUTO: 93.5 FL (ref 80–99)
MONOCYTES # BLD: 0.6 K/UL (ref 0–1)
MONOCYTES NFR BLD: 9 % (ref 5–13)
NEUTS SEG # BLD: 3.6 K/UL (ref 1.8–8)
NEUTS SEG NFR BLD: 58 % (ref 32–75)
NITRITE UR QL STRIP.AUTO: NEGATIVE
NRBC # BLD: 0 K/UL (ref 0–0.01)
NRBC BLD-RTO: 0 PER 100 WBC
PH UR STRIP: 7 (ref 5–8)
PLATELET # BLD AUTO: 209 K/UL (ref 150–400)
PMV BLD AUTO: 10.1 FL (ref 8.9–12.9)
POTASSIUM SERPL-SCNC: 3.6 MMOL/L (ref 3.5–5.1)
PROT SERPL-MCNC: 8 G/DL (ref 6.4–8.2)
PROT UR STRIP-MCNC: NEGATIVE MG/DL
PROTHROMBIN TIME: 10.8 SEC (ref 9–11.1)
RBC # BLD AUTO: 4.61 M/UL (ref 3.8–5.2)
RBC #/AREA URNS HPF: NORMAL /HPF (ref 0–5)
SODIUM SERPL-SCNC: 137 MMOL/L (ref 136–145)
SP GR UR REFRACTOMETRY: 1.01 (ref 1–1.03)
SPECIMEN EXP DATE BLD: NORMAL
THERAPEUTIC RANGE: NORMAL SECS (ref 58–77)
URINE CULTURE IF INDICATED: NORMAL
UROBILINOGEN UR QL STRIP.AUTO: 0.2 EU/DL (ref 0.2–1)
WBC # BLD AUTO: 6.3 K/UL (ref 3.6–11)
WBC URNS QL MICRO: NORMAL /HPF (ref 0–4)

## 2024-12-30 PROCEDURE — 82306 VITAMIN D 25 HYDROXY: CPT

## 2024-12-30 PROCEDURE — 86901 BLOOD TYPING SEROLOGIC RH(D): CPT

## 2024-12-30 PROCEDURE — 36415 COLL VENOUS BLD VENIPUNCTURE: CPT

## 2024-12-30 PROCEDURE — 85610 PROTHROMBIN TIME: CPT

## 2024-12-30 PROCEDURE — 81001 URINALYSIS AUTO W/SCOPE: CPT

## 2024-12-30 PROCEDURE — 80053 COMPREHEN METABOLIC PANEL: CPT

## 2024-12-30 PROCEDURE — 85025 COMPLETE CBC W/AUTO DIFF WBC: CPT

## 2024-12-30 PROCEDURE — 86850 RBC ANTIBODY SCREEN: CPT

## 2024-12-30 PROCEDURE — 86900 BLOOD TYPING SEROLOGIC ABO: CPT

## 2024-12-30 PROCEDURE — 93010 ELECTROCARDIOGRAM REPORT: CPT | Performed by: INTERNAL MEDICINE

## 2024-12-30 PROCEDURE — 83036 HEMOGLOBIN GLYCOSYLATED A1C: CPT

## 2024-12-30 PROCEDURE — 93005 ELECTROCARDIOGRAM TRACING: CPT | Performed by: ORTHOPAEDIC SURGERY

## 2024-12-30 PROCEDURE — 85730 THROMBOPLASTIN TIME PARTIAL: CPT

## 2024-12-30 ASSESSMENT — PAIN DESCRIPTION - LOCATION: LOCATION: HAND

## 2024-12-30 ASSESSMENT — PAIN SCALES - GENERAL: PAINLEVEL_OUTOF10: 7

## 2024-12-30 ASSESSMENT — PAIN DESCRIPTION - ORIENTATION: ORIENTATION: RIGHT;LEFT

## 2024-12-30 NOTE — DISCHARGE INSTRUCTIONS
[facility]                   [address] [place] [address]   PRE-ADMISSION TESTING    [phone]     Surgery Date:  Monday, January 13, 2025       Is surgery arrival time given by surgeon?  YES  NO  If “NO”, [facility] staff will call you between 3 and 7pm the day before your surgery with your arrival time. (If your surgery is on a Monday, we will call you the Friday before.)    Call [phone] after 7pm Monday-Friday if you did not receive this call.    INSTRUCTIONS BEFORE YOUR SURGERY   When You  Arrive Arrive at the 2nd Floor Admitting Desk on the day of your surgery  Have your insurance card, photo ID, and any copayment (if needed)   Food   and   Drink NO food or drink after midnight the night before surgery    This means NO gum, mints, coffee, juice, etc.    You may drink WATER ONLY up until 2 hours prior to your surgery time. Please do not    add anything to your water as this may result in your surgery being postponed.     No alcohol (beer, wine, liquor) 24 hours before and after surgery     Medications to   TAKE   Morning of Surgery MEDICATIONS TO TAKE THE MORNING OF SURGERY WITH WATER:     None   Medications  To  STOP      7 days before surgery Non-Steroidal anti-inflammatory Drugs (NSAID's): for example, Ibuprofen (Advil, Motrin), Naproxen (Aleve)  Aspirin, if taking for pain   Herbal supplements, vitamins, and fish oil  (Pain medications not listed above, including Tylenol may be taken)   Blood  Thinners If you take  Aspirin, Plavix, Coumadin, or any blood-thinning or anti-blood clot medicine, talk to the doctor who prescribed the medications for pre-operative instructions.   Bathing Clothing  Jewelry  Valuables     Shower the morning of surgery, please do not apply anything to your skin (lotions, powders, deodorant, or makeup, especially mascara)  Follow Chlorhexidine Care Fusion body wash instructions provided to you during PAT appointment. Begin 3 days prior to

## 2024-12-31 LAB
BACTERIA SPEC CULT: NORMAL
BACTERIA SPEC CULT: NORMAL
SERVICE CMNT-IMP: NORMAL

## 2025-01-02 NOTE — PERIOP NOTE
Perfect SErve to Stephen regarding + MSSA and Vit D level. Jino will inform PAT when rx sent and PAT will notify patient of tx for MSSA and Vit D.

## 2025-01-03 NOTE — PRE-PROCEDURE INSTRUCTIONS
The patient was provided a virtual link to view the pre-operative Spine Class.  A pre-operative Patient education booklet specific to spine surgery was given to the patient in PAT.  The content of the class was presented using an audio power point presentation specific for patients undergoing spine surgery.    Incentive spirometer and CHG bath kits were verbally reviewed.   Day of surgery routine and expectations, hospital routine and expectations, nutrition, alcohol, nicotine, medications, infection control, pain management, DVT precautions and equipment, ice therapy, durable medical equipment, exercises, mobility expectations and precautions, home preparation and safety were reviewed in class.   My contact information was shared with the patient to provide further information as requested by the patient related to their upcoming surgery.   Received confirmation that the patient and  viewed spine class online via the [employer] pre-op education video survey with quiz.

## 2025-01-13 ENCOUNTER — ANESTHESIA EVENT (OUTPATIENT)
Facility: HOSPITAL | Age: 40
End: 2025-01-13
Payer: MEDICAID

## 2025-01-13 ENCOUNTER — APPOINTMENT (OUTPATIENT)
Facility: HOSPITAL | Age: 40
End: 2025-01-13
Attending: ORTHOPAEDIC SURGERY
Payer: MEDICAID

## 2025-01-13 ENCOUNTER — ANESTHESIA (OUTPATIENT)
Facility: HOSPITAL | Age: 40
End: 2025-01-13
Payer: MEDICAID

## 2025-01-13 ENCOUNTER — HOSPITAL ENCOUNTER (OUTPATIENT)
Facility: HOSPITAL | Age: 40
Setting detail: OBSERVATION
Discharge: HOME | End: 2025-01-14
Attending: ORTHOPAEDIC SURGERY | Admitting: ORTHOPAEDIC SURGERY
Payer: MEDICAID

## 2025-01-13 DIAGNOSIS — Z98.1 S/P CERVICAL SPINAL FUSION: ICD-10-CM

## 2025-01-13 DIAGNOSIS — M79.89 SWELLING OF BOTH LOWER EXTREMITIES: Primary | ICD-10-CM

## 2025-01-13 PROBLEM — M50.90 CERVICAL DISC DISORDER: Status: ACTIVE | Noted: 2025-01-13

## 2025-01-13 LAB — HCG UR QL: NEGATIVE

## 2025-01-13 PROCEDURE — G0378 HOSPITAL OBSERVATION PER HR: HCPCS

## 2025-01-13 PROCEDURE — 6360000002 HC RX W HCPCS: Performed by: NURSE ANESTHETIST, CERTIFIED REGISTERED

## 2025-01-13 PROCEDURE — 36415 COLL VENOUS BLD VENIPUNCTURE: CPT

## 2025-01-13 PROCEDURE — 2720000010 HC SURG SUPPLY STERILE: Performed by: ORTHOPAEDIC SURGERY

## 2025-01-13 PROCEDURE — 94761 N-INVAS EAR/PLS OXIMETRY MLT: CPT

## 2025-01-13 PROCEDURE — 3600000004 HC SURGERY LEVEL 4 BASE: Performed by: ORTHOPAEDIC SURGERY

## 2025-01-13 PROCEDURE — 6360000002 HC RX W HCPCS: Performed by: NURSE PRACTITIONER

## 2025-01-13 PROCEDURE — 3700000000 HC ANESTHESIA ATTENDED CARE: Performed by: ORTHOPAEDIC SURGERY

## 2025-01-13 PROCEDURE — 2580000003 HC RX 258: Performed by: NURSE PRACTITIONER

## 2025-01-13 PROCEDURE — 6360000002 HC RX W HCPCS: Performed by: ORTHOPAEDIC SURGERY

## 2025-01-13 PROCEDURE — 2580000003 HC RX 258: Performed by: NURSE ANESTHETIST, CERTIFIED REGISTERED

## 2025-01-13 PROCEDURE — C1713 ANCHOR/SCREW BN/BN,TIS/BN: HCPCS | Performed by: ORTHOPAEDIC SURGERY

## 2025-01-13 PROCEDURE — 2580000003 HC RX 258: Performed by: ORTHOPAEDIC SURGERY

## 2025-01-13 PROCEDURE — 6370000000 HC RX 637 (ALT 250 FOR IP): Performed by: ORTHOPAEDIC SURGERY

## 2025-01-13 PROCEDURE — L0190 CERV COLLAR SUPP ADJ CERV BA: HCPCS | Performed by: ORTHOPAEDIC SURGERY

## 2025-01-13 PROCEDURE — 2580000003 HC RX 258: Performed by: ANESTHESIOLOGY

## 2025-01-13 PROCEDURE — 81025 URINE PREGNANCY TEST: CPT

## 2025-01-13 PROCEDURE — 7100000001 HC PACU RECOVERY - ADDTL 15 MIN: Performed by: ORTHOPAEDIC SURGERY

## 2025-01-13 PROCEDURE — 2500000003 HC RX 250 WO HCPCS: Performed by: NURSE ANESTHETIST, CERTIFIED REGISTERED

## 2025-01-13 PROCEDURE — 3600000014 HC SURGERY LEVEL 4 ADDTL 15MIN: Performed by: ORTHOPAEDIC SURGERY

## 2025-01-13 PROCEDURE — 6370000000 HC RX 637 (ALT 250 FOR IP): Performed by: NURSE PRACTITIONER

## 2025-01-13 PROCEDURE — C1889 IMPLANT/INSERT DEVICE, NOC: HCPCS | Performed by: ORTHOPAEDIC SURGERY

## 2025-01-13 PROCEDURE — 2709999900 HC NON-CHARGEABLE SUPPLY: Performed by: ORTHOPAEDIC SURGERY

## 2025-01-13 PROCEDURE — 7100000000 HC PACU RECOVERY - FIRST 15 MIN: Performed by: ORTHOPAEDIC SURGERY

## 2025-01-13 PROCEDURE — 3700000001 HC ADD 15 MINUTES (ANESTHESIA): Performed by: ORTHOPAEDIC SURGERY

## 2025-01-13 PROCEDURE — APPNB30 APP NON BILLABLE TIME 0-30 MINS: Performed by: NURSE PRACTITIONER

## 2025-01-13 DEVICE — GRAFT BNE 5 ML SUBSTITUTE VIABLE OSSIGRAFT: Type: IMPLANTABLE DEVICE | Site: SPINE CERVICAL | Status: FUNCTIONAL

## 2025-01-13 DEVICE — ALLOGRAFT BNE SYRINGE MED 4 CC DBM FIBER OSTEOSTRAND PLUS: Type: IMPLANTABLE DEVICE | Site: SPINE CERVICAL | Status: FUNCTIONAL

## 2025-01-13 DEVICE — IMPLANTABLE DEVICE: Type: IMPLANTABLE DEVICE | Site: SPINE CERVICAL | Status: FUNCTIONAL

## 2025-01-13 DEVICE — HEDRON IC SPACER, 10X16, 5MM, 7°
Type: IMPLANTABLE DEVICE | Site: SPINE CERVICAL | Status: FUNCTIONAL
Brand: HEDRON

## 2025-01-13 DEVICE — HEDRON IC SPACER, 10.5X18, 7MM, 7°
Type: IMPLANTABLE DEVICE | Site: SPINE CERVICAL | Status: FUNCTIONAL
Brand: HEDRON

## 2025-01-13 DEVICE — GRAFT BNE XL: Type: IMPLANTABLE DEVICE | Site: SPINE CERVICAL | Status: FUNCTIONAL

## 2025-01-13 DEVICE — PLATE SPNL REDUC PROF 18X7 MM COALITION AGX: Type: IMPLANTABLE DEVICE | Site: SPINE CERVICAL | Status: FUNCTIONAL

## 2025-01-13 DEVICE — 4.2MM BONE SCREW, VARIABLE, SELF-TAPPING, 14MM
Type: IMPLANTABLE DEVICE | Site: SPINE CERVICAL | Status: FUNCTIONAL
Brand: COALITION

## 2025-01-13 DEVICE — HEDRON IC SPACER, 10.5X18, 6MM, 7°
Type: IMPLANTABLE DEVICE | Site: SPINE CERVICAL | Status: FUNCTIONAL
Brand: HEDRON

## 2025-01-13 DEVICE — 4.2MM BONE SCREW, VARIABLE, SELF-TAPPING, 16MM
Type: IMPLANTABLE DEVICE | Site: SPINE CERVICAL | Status: FUNCTIONAL
Brand: COALITION

## 2025-01-13 RX ORDER — SODIUM CHLORIDE 9 MG/ML
INJECTION, SOLUTION INTRAVENOUS CONTINUOUS
Status: DISCONTINUED | OUTPATIENT
Start: 2025-01-13 | End: 2025-01-13 | Stop reason: HOSPADM

## 2025-01-13 RX ORDER — OXYCODONE HYDROCHLORIDE 5 MG/1
10 TABLET ORAL EVERY 4 HOURS PRN
Status: DISCONTINUED | OUTPATIENT
Start: 2025-01-13 | End: 2025-01-14 | Stop reason: HOSPADM

## 2025-01-13 RX ORDER — ONDANSETRON 2 MG/ML
4 INJECTION INTRAMUSCULAR; INTRAVENOUS EVERY 6 HOURS PRN
Status: DISCONTINUED | OUTPATIENT
Start: 2025-01-13 | End: 2025-01-14 | Stop reason: HOSPADM

## 2025-01-13 RX ORDER — SODIUM CHLORIDE 9 MG/ML
INJECTION, SOLUTION INTRAVENOUS CONTINUOUS
Status: DISCONTINUED | OUTPATIENT
Start: 2025-01-13 | End: 2025-01-14 | Stop reason: HOSPADM

## 2025-01-13 RX ORDER — CYCLOBENZAPRINE HCL 10 MG
10 TABLET ORAL EVERY 12 HOURS PRN
Status: DISCONTINUED | OUTPATIENT
Start: 2025-01-13 | End: 2025-01-14 | Stop reason: HOSPADM

## 2025-01-13 RX ORDER — LIDOCAINE HYDROCHLORIDE 10 MG/ML
1 INJECTION, SOLUTION EPIDURAL; INFILTRATION; INTRACAUDAL; PERINEURAL
Status: DISCONTINUED | OUTPATIENT
Start: 2025-01-13 | End: 2025-01-13 | Stop reason: HOSPADM

## 2025-01-13 RX ORDER — EPHEDRINE SULFATE 50 MG/ML
INJECTION INTRAVENOUS
Status: DISCONTINUED | OUTPATIENT
Start: 2025-01-13 | End: 2025-01-13 | Stop reason: SDUPTHER

## 2025-01-13 RX ORDER — SODIUM CHLORIDE, SODIUM LACTATE, POTASSIUM CHLORIDE, CALCIUM CHLORIDE 600; 310; 30; 20 MG/100ML; MG/100ML; MG/100ML; MG/100ML
INJECTION, SOLUTION INTRAVENOUS CONTINUOUS
Status: DISCONTINUED | OUTPATIENT
Start: 2025-01-13 | End: 2025-01-13 | Stop reason: HOSPADM

## 2025-01-13 RX ORDER — PHENYLEPHRINE HCL IN 0.9% NACL 0.4MG/10ML
SYRINGE (ML) INTRAVENOUS
Status: DISCONTINUED | OUTPATIENT
Start: 2025-01-13 | End: 2025-01-13 | Stop reason: SDUPTHER

## 2025-01-13 RX ORDER — PROPOFOL 10 MG/ML
INJECTION, EMULSION INTRAVENOUS
Status: DISCONTINUED | OUTPATIENT
Start: 2025-01-13 | End: 2025-01-13 | Stop reason: SDUPTHER

## 2025-01-13 RX ORDER — FAMOTIDINE 20 MG/1
20 TABLET, FILM COATED ORAL 2 TIMES DAILY
Status: DISCONTINUED | OUTPATIENT
Start: 2025-01-13 | End: 2025-01-14 | Stop reason: HOSPADM

## 2025-01-13 RX ORDER — DEXAMETHASONE SODIUM PHOSPHATE 10 MG/ML
10 INJECTION, SOLUTION INTRAMUSCULAR; INTRAVENOUS EVERY 8 HOURS
Status: COMPLETED | OUTPATIENT
Start: 2025-01-13 | End: 2025-01-14

## 2025-01-13 RX ORDER — SODIUM CHLORIDE, SODIUM LACTATE, POTASSIUM CHLORIDE, CALCIUM CHLORIDE 600; 310; 30; 20 MG/100ML; MG/100ML; MG/100ML; MG/100ML
INJECTION, SOLUTION INTRAVENOUS
Status: DISCONTINUED | OUTPATIENT
Start: 2025-01-13 | End: 2025-01-13 | Stop reason: SDUPTHER

## 2025-01-13 RX ORDER — NALOXONE HYDROCHLORIDE 0.4 MG/ML
INJECTION, SOLUTION INTRAMUSCULAR; INTRAVENOUS; SUBCUTANEOUS PRN
Status: DISCONTINUED | OUTPATIENT
Start: 2025-01-13 | End: 2025-01-13 | Stop reason: HOSPADM

## 2025-01-13 RX ORDER — SUCCINYLCHOLINE CHLORIDE 20 MG/ML
INJECTION INTRAMUSCULAR; INTRAVENOUS
Status: DISCONTINUED | OUTPATIENT
Start: 2025-01-13 | End: 2025-01-13 | Stop reason: SDUPTHER

## 2025-01-13 RX ORDER — OXYCODONE HYDROCHLORIDE 5 MG/1
5 TABLET ORAL EVERY 4 HOURS PRN
Status: DISCONTINUED | OUTPATIENT
Start: 2025-01-13 | End: 2025-01-14 | Stop reason: HOSPADM

## 2025-01-13 RX ORDER — 0.9 % SODIUM CHLORIDE 0.9 %
INTRAVENOUS SOLUTION INTRAVENOUS
Status: DISCONTINUED | OUTPATIENT
Start: 2025-01-13 | End: 2025-01-13 | Stop reason: SDUPTHER

## 2025-01-13 RX ORDER — KETOROLAC TROMETHAMINE 30 MG/ML
30 INJECTION, SOLUTION INTRAMUSCULAR; INTRAVENOUS EVERY 6 HOURS PRN
Status: DISCONTINUED | OUTPATIENT
Start: 2025-01-13 | End: 2025-01-14 | Stop reason: HOSPADM

## 2025-01-13 RX ORDER — ONDANSETRON 2 MG/ML
INJECTION INTRAMUSCULAR; INTRAVENOUS
Status: DISCONTINUED | OUTPATIENT
Start: 2025-01-13 | End: 2025-01-13 | Stop reason: SDUPTHER

## 2025-01-13 RX ORDER — SODIUM CHLORIDE 0.9 % (FLUSH) 0.9 %
5-40 SYRINGE (ML) INJECTION PRN
Status: DISCONTINUED | OUTPATIENT
Start: 2025-01-13 | End: 2025-01-14 | Stop reason: HOSPADM

## 2025-01-13 RX ORDER — ROCURONIUM BROMIDE 10 MG/ML
INJECTION, SOLUTION INTRAVENOUS
Status: DISCONTINUED | OUTPATIENT
Start: 2025-01-13 | End: 2025-01-13 | Stop reason: SDUPTHER

## 2025-01-13 RX ORDER — DEXAMETHASONE SODIUM PHOSPHATE 4 MG/ML
INJECTION, SOLUTION INTRA-ARTICULAR; INTRALESIONAL; INTRAMUSCULAR; INTRAVENOUS; SOFT TISSUE
Status: DISCONTINUED | OUTPATIENT
Start: 2025-01-13 | End: 2025-01-13 | Stop reason: SDUPTHER

## 2025-01-13 RX ORDER — SODIUM CHLORIDE 9 MG/ML
INJECTION, SOLUTION INTRAVENOUS PRN
Status: DISCONTINUED | OUTPATIENT
Start: 2025-01-13 | End: 2025-01-14 | Stop reason: HOSPADM

## 2025-01-13 RX ORDER — CELECOXIB 100 MG/1
200 CAPSULE ORAL ONCE
Status: COMPLETED | OUTPATIENT
Start: 2025-01-13 | End: 2025-01-13

## 2025-01-13 RX ORDER — DIPHENHYDRAMINE HYDROCHLORIDE 50 MG/ML
25 INJECTION INTRAMUSCULAR; INTRAVENOUS EVERY 6 HOURS PRN
Status: DISCONTINUED | OUTPATIENT
Start: 2025-01-13 | End: 2025-01-14 | Stop reason: HOSPADM

## 2025-01-13 RX ORDER — DEXMEDETOMIDINE HYDROCHLORIDE 100 UG/ML
INJECTION, SOLUTION INTRAVENOUS
Status: DISCONTINUED | OUTPATIENT
Start: 2025-01-13 | End: 2025-01-13 | Stop reason: SDUPTHER

## 2025-01-13 RX ORDER — ACETAMINOPHEN 500 MG
1000 TABLET ORAL ONCE
Status: COMPLETED | OUTPATIENT
Start: 2025-01-13 | End: 2025-01-13

## 2025-01-13 RX ORDER — FENTANYL CITRATE 50 UG/ML
100 INJECTION, SOLUTION INTRAMUSCULAR; INTRAVENOUS
Status: DISCONTINUED | OUTPATIENT
Start: 2025-01-13 | End: 2025-01-13 | Stop reason: HOSPADM

## 2025-01-13 RX ORDER — DIPHENHYDRAMINE HYDROCHLORIDE 50 MG/ML
12.5 INJECTION INTRAMUSCULAR; INTRAVENOUS
Status: DISCONTINUED | OUTPATIENT
Start: 2025-01-13 | End: 2025-01-13 | Stop reason: HOSPADM

## 2025-01-13 RX ORDER — ONDANSETRON 2 MG/ML
4 INJECTION INTRAMUSCULAR; INTRAVENOUS
Status: DISCONTINUED | OUTPATIENT
Start: 2025-01-13 | End: 2025-01-13 | Stop reason: HOSPADM

## 2025-01-13 RX ORDER — DEXAMETHASONE SODIUM PHOSPHATE 10 MG/ML
6 INJECTION, SOLUTION INTRAMUSCULAR; INTRAVENOUS EVERY 6 HOURS
Status: DISCONTINUED | OUTPATIENT
Start: 2025-01-14 | End: 2025-01-14 | Stop reason: HOSPADM

## 2025-01-13 RX ORDER — GABAPENTIN 300 MG/1
300 CAPSULE ORAL ONCE
Status: COMPLETED | OUTPATIENT
Start: 2025-01-13 | End: 2025-01-13

## 2025-01-13 RX ORDER — DIPHENHYDRAMINE HCL 25 MG
25 CAPSULE ORAL EVERY 6 HOURS PRN
Status: DISCONTINUED | OUTPATIENT
Start: 2025-01-13 | End: 2025-01-14 | Stop reason: HOSPADM

## 2025-01-13 RX ORDER — FENTANYL CITRATE 50 UG/ML
INJECTION, SOLUTION INTRAMUSCULAR; INTRAVENOUS
Status: DISCONTINUED | OUTPATIENT
Start: 2025-01-13 | End: 2025-01-13 | Stop reason: SDUPTHER

## 2025-01-13 RX ORDER — DEXAMETHASONE SODIUM PHOSPHATE 4 MG/ML
4 INJECTION, SOLUTION INTRA-ARTICULAR; INTRALESIONAL; INTRAMUSCULAR; INTRAVENOUS; SOFT TISSUE EVERY 6 HOURS
Status: DISCONTINUED | OUTPATIENT
Start: 2025-01-15 | End: 2025-01-14 | Stop reason: HOSPADM

## 2025-01-13 RX ORDER — MIDAZOLAM HYDROCHLORIDE 2 MG/2ML
2 INJECTION, SOLUTION INTRAMUSCULAR; INTRAVENOUS
Status: DISCONTINUED | OUTPATIENT
Start: 2025-01-13 | End: 2025-01-13 | Stop reason: HOSPADM

## 2025-01-13 RX ORDER — MULTIVIT-MIN/IRON/FOLIC ACID/K 18-600-40
2000 CAPSULE ORAL DAILY
COMMUNITY

## 2025-01-13 RX ORDER — MIDAZOLAM HYDROCHLORIDE 1 MG/ML
INJECTION, SOLUTION INTRAMUSCULAR; INTRAVENOUS
Status: DISCONTINUED | OUTPATIENT
Start: 2025-01-13 | End: 2025-01-13 | Stop reason: SDUPTHER

## 2025-01-13 RX ORDER — VITAMIN B COMPLEX
2000 TABLET ORAL DAILY
Status: DISCONTINUED | OUTPATIENT
Start: 2025-01-13 | End: 2025-01-14 | Stop reason: HOSPADM

## 2025-01-13 RX ORDER — DEXAMETHASONE SODIUM PHOSPHATE 4 MG/ML
2 INJECTION, SOLUTION INTRA-ARTICULAR; INTRALESIONAL; INTRAMUSCULAR; INTRAVENOUS; SOFT TISSUE EVERY 6 HOURS
Status: DISCONTINUED | OUTPATIENT
Start: 2025-01-16 | End: 2025-01-14 | Stop reason: HOSPADM

## 2025-01-13 RX ORDER — SODIUM CHLORIDE 0.9 % (FLUSH) 0.9 %
5-40 SYRINGE (ML) INJECTION EVERY 12 HOURS SCHEDULED
Status: DISCONTINUED | OUTPATIENT
Start: 2025-01-13 | End: 2025-01-14 | Stop reason: HOSPADM

## 2025-01-13 RX ADMIN — DEXMEDETOMIDINE 4 MCG: 100 INJECTION, SOLUTION INTRAVENOUS at 10:45

## 2025-01-13 RX ADMIN — ONDANSETRON 4 MG: 2 INJECTION, SOLUTION INTRAMUSCULAR; INTRAVENOUS at 07:59

## 2025-01-13 RX ADMIN — DEXMEDETOMIDINE 4 MCG: 100 INJECTION, SOLUTION INTRAVENOUS at 10:30

## 2025-01-13 RX ADMIN — ROCURONIUM BROMIDE 10 MG: 10 INJECTION, SOLUTION INTRAVENOUS at 08:06

## 2025-01-13 RX ADMIN — PROPOFOL 75 MCG/KG/MIN: 10 INJECTION, EMULSION INTRAVENOUS at 08:05

## 2025-01-13 RX ADMIN — SODIUM CHLORIDE: 9 INJECTION, SOLUTION INTRAVENOUS at 07:58

## 2025-01-13 RX ADMIN — CELECOXIB 200 MG: 100 CAPSULE ORAL at 07:01

## 2025-01-13 RX ADMIN — Medication 2000 UNITS: at 17:03

## 2025-01-13 RX ADMIN — MIDAZOLAM HYDROCHLORIDE 1 MG: 1 INJECTION, SOLUTION INTRAMUSCULAR; INTRAVENOUS at 08:00

## 2025-01-13 RX ADMIN — FENTANYL CITRATE 50 MCG: 50 INJECTION, SOLUTION INTRAMUSCULAR; INTRAVENOUS at 12:07

## 2025-01-13 RX ADMIN — PROPOFOL 75 MCG/KG/MIN: 10 INJECTION, EMULSION INTRAVENOUS at 09:50

## 2025-01-13 RX ADMIN — PROPOFOL 120 MG: 10 INJECTION, EMULSION INTRAVENOUS at 08:06

## 2025-01-13 RX ADMIN — DEXMEDETOMIDINE 4 MCG: 100 INJECTION, SOLUTION INTRAVENOUS at 10:41

## 2025-01-13 RX ADMIN — ROCURONIUM BROMIDE 5 MG: 10 INJECTION, SOLUTION INTRAVENOUS at 11:27

## 2025-01-13 RX ADMIN — FAMOTIDINE 20 MG: 20 TABLET, FILM COATED ORAL at 20:58

## 2025-01-13 RX ADMIN — DEXAMETHASONE SODIUM PHOSPHATE 8 MG: 4 INJECTION INTRA-ARTICULAR; INTRALESIONAL; INTRAMUSCULAR; INTRAVENOUS; SOFT TISSUE at 08:35

## 2025-01-13 RX ADMIN — PROPOFOL 30 MG: 10 INJECTION, EMULSION INTRAVENOUS at 11:44

## 2025-01-13 RX ADMIN — MIDAZOLAM HYDROCHLORIDE 1 MG: 1 INJECTION, SOLUTION INTRAMUSCULAR; INTRAVENOUS at 09:11

## 2025-01-13 RX ADMIN — SUGAMMADEX 120 MG: 100 INJECTION, SOLUTION INTRAVENOUS at 12:47

## 2025-01-13 RX ADMIN — DEXAMETHASONE SODIUM PHOSPHATE 10 MG: 10 INJECTION, SOLUTION INTRAMUSCULAR; INTRAVENOUS at 17:04

## 2025-01-13 RX ADMIN — SUCCINYLCHOLINE CHLORIDE 100 MG: 20 INJECTION, SOLUTION INTRAMUSCULAR; INTRAVENOUS at 08:07

## 2025-01-13 RX ADMIN — Medication 30 MG: at 08:58

## 2025-01-13 RX ADMIN — DEXMEDETOMIDINE 4 MCG: 100 INJECTION, SOLUTION INTRAVENOUS at 10:43

## 2025-01-13 RX ADMIN — ROCURONIUM BROMIDE 30 MG: 10 INJECTION, SOLUTION INTRAVENOUS at 08:51

## 2025-01-13 RX ADMIN — SODIUM CHLORIDE, POTASSIUM CHLORIDE, SODIUM LACTATE AND CALCIUM CHLORIDE: 600; 310; 30; 20 INJECTION, SOLUTION INTRAVENOUS at 11:29

## 2025-01-13 RX ADMIN — MIDAZOLAM HYDROCHLORIDE 2 MG: 1 INJECTION, SOLUTION INTRAMUSCULAR; INTRAVENOUS at 07:58

## 2025-01-13 RX ADMIN — PROPOFOL 75 MCG/KG/MIN: 10 INJECTION, EMULSION INTRAVENOUS at 11:24

## 2025-01-13 RX ADMIN — Medication 20 MG: at 09:20

## 2025-01-13 RX ADMIN — FAMOTIDINE 20 MG: 20 TABLET, FILM COATED ORAL at 17:04

## 2025-01-13 RX ADMIN — MIDAZOLAM HYDROCHLORIDE 1 MG: 1 INJECTION, SOLUTION INTRAMUSCULAR; INTRAVENOUS at 09:50

## 2025-01-13 RX ADMIN — ROCURONIUM BROMIDE 10 MG: 10 INJECTION, SOLUTION INTRAVENOUS at 08:40

## 2025-01-13 RX ADMIN — SODIUM CHLORIDE: 9 INJECTION, SOLUTION INTRAVENOUS at 17:14

## 2025-01-13 RX ADMIN — FENTANYL CITRATE 50 MCG: 50 INJECTION, SOLUTION INTRAMUSCULAR; INTRAVENOUS at 09:18

## 2025-01-13 RX ADMIN — SODIUM CHLORIDE 70 ML: 900 INJECTION, SOLUTION INTRAVENOUS at 12:29

## 2025-01-13 RX ADMIN — GABAPENTIN 300 MG: 300 CAPSULE ORAL at 07:01

## 2025-01-13 RX ADMIN — EPHEDRINE SULFATE 10 MG: 50 INJECTION, SOLUTION INTRAVENOUS at 11:15

## 2025-01-13 RX ADMIN — EPHEDRINE SULFATE 10 MG: 50 INJECTION, SOLUTION INTRAVENOUS at 11:26

## 2025-01-13 RX ADMIN — FENTANYL CITRATE 50 MCG: 50 INJECTION, SOLUTION INTRAMUSCULAR; INTRAVENOUS at 10:27

## 2025-01-13 RX ADMIN — ACETAMINOPHEN 1000 MG: 500 TABLET ORAL at 07:01

## 2025-01-13 RX ADMIN — DEXMEDETOMIDINE 4 MCG: 100 INJECTION, SOLUTION INTRAVENOUS at 10:28

## 2025-01-13 RX ADMIN — SODIUM CHLORIDE 25 ML/HR: 900 INJECTION, SOLUTION INTRAVENOUS at 08:20

## 2025-01-13 RX ADMIN — VANCOMYCIN HYDROCHLORIDE 1000 MG: 1 INJECTION, POWDER, LYOPHILIZED, FOR SOLUTION INTRAVENOUS at 08:20

## 2025-01-13 RX ADMIN — FENTANYL CITRATE 100 MCG: 50 INJECTION, SOLUTION INTRAMUSCULAR; INTRAVENOUS at 08:05

## 2025-01-13 RX ADMIN — SODIUM CHLORIDE 125 ML: 900 INJECTION, SOLUTION INTRAVENOUS at 09:00

## 2025-01-13 RX ADMIN — VANCOMYCIN HYDROCHLORIDE 1000 MG: 1 INJECTION, POWDER, LYOPHILIZED, FOR SOLUTION INTRAVENOUS at 17:22

## 2025-01-13 RX ADMIN — FENTANYL CITRATE 50 MCG: 50 INJECTION, SOLUTION INTRAMUSCULAR; INTRAVENOUS at 09:04

## 2025-01-13 RX ADMIN — Medication 40 MCG: at 11:06

## 2025-01-13 ASSESSMENT — PAIN - FUNCTIONAL ASSESSMENT: PAIN_FUNCTIONAL_ASSESSMENT: 0-10

## 2025-01-13 ASSESSMENT — PAIN SCALES - GENERAL
PAINLEVEL_OUTOF10: 1
PAINLEVEL_OUTOF10: 0

## 2025-01-13 NOTE — ANESTHESIA POSTPROCEDURE EVALUATION
Department of Anesthesiology  Postprocedure Note    Patient: Anny Lara  MRN: [id number]  YOB: 1985  Date of evaluation: 1/13/2025    Procedure Summary       Date: 01/13/25 Room / Location: [facility] MAIN OR  / [facility] MAIN OR    Anesthesia Start: 0758 Anesthesia Stop: 1314    Procedure: C3-C7 ANTERIOR CERVICAL DISCECTOMY AND FUSION WITH INSTRUMENTATION (Spine Cervical) Diagnosis:       DDD (degenerative disc disease), cervical      Congenital cervical spine stenosis      (DDD (degenerative disc disease), cervical [M50.30])      (Congenital cervical spine stenosis [Q76.49])    Surgeons: Pollo Cuevas MD Responsible Provider: Ozzie De La Vega MD    Anesthesia Type: General ASA Status: 2            Anesthesia Type: General    Kaylee Phase I: Kaylee Score: 8    Kaylee Phase II:      Anesthesia Post Evaluation    Patient location during evaluation: PACU  Patient participation: complete - patient participated  Level of consciousness: awake  Airway patency: patent  Nausea & Vomiting: no vomiting and no nausea  Cardiovascular status: hemodynamically stable  Respiratory status: acceptable  Hydration status: stable  Pain management: adequate    No notable events documented.

## 2025-01-13 NOTE — ANESTHESIA PRE PROCEDURE
Department of Anesthesiology  Preprocedure Note       Name:  Anny Lara   Age:  39 y.o.  :  1985                                          MRN:  [id number]         Date:  2025      Surgeon: Surgeon(s):  Pollo Cuevas MD    Procedure: Procedure(s):  C3-C7 ANTERIOR CERVICAL DISCECTOMY AND FUSION WITH INSTRUMENTATION    Medications prior to admission:   Prior to Admission medications    Medication Sig Start Date End Date Taking? Authorizing Provider   vitamin D 50 MCG (2000) CAPS capsule Take 1 capsule by mouth daily   Yes Annette Reeves MD   IBUPROFEN PO Take by mouth as needed  Patient not taking: Reported on 2025    Annette Reeves MD       Current medications:    Current Facility-Administered Medications   Medication Dose Route Frequency Provider Last Rate Last Admin   • lidocaine PF 1 % injection 1 mL  1 mL IntraDERmal Once PRN Elizabeth Curry MD       • fentaNYL (SUBLIMAZE) injection 100 mcg  100 mcg IntraVENous Once PRN Elizabeth Curry MD       • 0.9 % sodium chloride infusion   IntraVENous Continuous Elizabeth Curry MD       • midazolam PF (VERSED) injection 2 mg  2 mg IntraVENous Once PRN Elizabeth Curry MD       • vancomycin (VANCOCIN) 1,000 mg in sodium chloride 0.9 % 250 mL IVPB (Vnul2Jml)  1,000 mg IntraVENous Once Pollo Cuevas MD       • gabapentin (NEURONTIN) capsule 300 mg  300 mg Oral Once Pollo Cuevas MD           Allergies:    Allergies   Allergen Reactions   • Acetaminophen Itching   • Amoxicillin Hives   • Ibuprofen Itching   • Nsaids Itching   • Penicillins Hives   • Hydrocodone Hives       Problem List:    Patient Active Problem List   Diagnosis Code   • Fatigue R53.83   • Subclinical hyperthyroidism E05.90       Past Medical History:        Diagnosis Date   • COVID     x2   • Subclinical hyperthyroidism 2020    resolved as of    • Thyroid function test abnormal 2020   • Tinea versicolor        Past Surgical History:        Procedure

## 2025-01-13 NOTE — OP NOTE
mechanism. Final flouroscopic imaging demonstrated safe position of hardware and interbodies. We had good hemostasis. I copiously irrigated both wounds. I placed a deep drain in the upper window. Trachea-esophageal complex and carotid sheath inspected and no evidence of injury. I gently massaged the trachea-esophageal complex back toward midline. Both wounds were closed with 3-0 Vicryl and 4-0 Stratafix. A sterile dressing was applied. The patient was extubated and transferred to the recovery room in good medical condition.     Christina Potter NP, NP was present and scrubbed for the entire procedure and assisted with retractors, suction, exposure, graft preparation, instrumentation preparation and wound closure.       I, Dr. Pollo Cuevas, performed the above procedures.     Pollo Cuevas MD  1/13/2025

## 2025-01-13 NOTE — H&P
H&P Update:  was seen and examined.  History and physical has been reviewed. The patient has been examined. There have been no significant clinical changes since the completion of the originally dated History and Physical.  Patient identified by surgeon; surgical site was confirmed by patient and surgeon.  LUE weakness and numbness.  3/5 WE, 4/5 bicep, delt  Discussed 2 incisions.

## 2025-01-13 NOTE — PERIOP NOTE
TRANSFER - OUT REPORT:    Verbal report given to RN Anny on Anny Lara  being transferred to [facility] for routine post-op       Report consisted of patient's Situation, Background, Assessment and   Recommendations(SBAR).     Information from the following report(s) Nurse Handoff Report, Surgery Report, Intake/Output, MAR, Cardiac Rhythm nsr, Procedure Verification, and Neuro Assessment was reviewed with the receiving nurse.           Lines:   Peripheral IV 01/13/25 Anterior;Right Wrist (Active)   Site Assessment Clean, dry & intact 01/13/25 0658   Line Status Infusing 01/13/25 0658   Line Care Connections checked and tightened 01/13/25 0658   Phlebitis Assessment No symptoms 01/13/25 0658   Infiltration Assessment 0 01/13/25 0658   Alcohol Cap Used Yes 01/13/25 0658   Dressing Status New dressing applied 01/13/25 0658   Dressing Type Transparent 01/13/25 0658   Dressing Intervention New 01/13/25 0658       Peripheral IV 01/13/25 Left Hand (Active)        Opportunity for questions and clarification was provided.      Patient transported with:  Registered Nurse

## 2025-01-13 NOTE — PROGRESS NOTES
Patient seen in recovery room  preop left arm discomfort, numbness, weakness improved  Extubated uneventfully  Postop pain well controlled  Tolerating soft drink without any challenges     Patient Vitals for the past 4 hrs:   Temp Pulse Resp BP SpO2   01/13/25 1400 -- 83 21 -- 97 %   01/13/25 1355 -- 87 25 116/81 99 %   01/13/25 1350 -- 88 18 118/83 98 %   01/13/25 1345 -- 82 12 113/86 99 %   01/13/25 1340 -- 82 12 117/71 99 %   01/13/25 1335 -- 86 16 112/79 98 %   01/13/25 1330 -- 86 22 115/72 98 %   01/13/25 1325 -- 75 17 102/71 97 %   01/13/25 1320 -- 73 10 98/73 95 %   01/13/25 1318 -- 75 11 99/68 96 %   01/13/25 1315 97.7 °F (36.5 °C) 74 10 99/68 96 %   01/13/25 1313 97.7 °F (36.5 °C) -- -- 102/70 --   01/13/25 1311 -- 81 10 102/70 96 %       Following commands  Dressing clean and dry  hemovac in place and functioning with minimal output  Strong motor RUE and LUE, RLE and LLE   Sensation grossly intact to LT     Stable postop ACDF C3-C7  -discussed surgery with family, answered questions  -periop antibiotics  -SCD's  -advance diet as tolerated, 'easy to chew' is the goal  -mobilize  -pain control   -plan d/c home tomorrow    TIM Gabriel - NP      Patient seen and examined in recovery, she is doing great she is already been tolerating liquid intake minimal swallowing difficulty.  She reports improvement in neck pain and bilateral arm pain.  She denies pain currently.  Sensation is improved bilaterally in upper extremities.  Strong motor right and left upper extremity and right and left lower extremity.  Dressing is clean and dry.  Reviewed surgery with patient as well as patient's family as well as pre and postop images we discussed postop plan of care.  I also confirmed allergies, although patient has numerous allergies to pain medications anti-inflammatories and Tylenol, it is only itching.  She has not had any anaphylaxis or rash or hives.  We did she has tolerated tramadol in the past.  We discussed

## 2025-01-14 ENCOUNTER — APPOINTMENT (OUTPATIENT)
Dept: VASCULAR SURGERY | Facility: HOSPITAL | Age: 40
End: 2025-01-14
Attending: ORTHOPAEDIC SURGERY
Payer: MEDICAID

## 2025-01-14 VITALS
DIASTOLIC BLOOD PRESSURE: 77 MMHG | HEART RATE: 73 BPM | TEMPERATURE: 97.7 F | HEIGHT: 66 IN | RESPIRATION RATE: 16 BRPM | BODY MASS INDEX: 21.29 KG/M2 | OXYGEN SATURATION: 97 % | WEIGHT: 132.5 LBS | SYSTOLIC BLOOD PRESSURE: 116 MMHG

## 2025-01-14 LAB — ECHO BSA: 1.67 M2

## 2025-01-14 PROCEDURE — 6360000002 HC RX W HCPCS: Performed by: NURSE PRACTITIONER

## 2025-01-14 PROCEDURE — G0378 HOSPITAL OBSERVATION PER HR: HCPCS

## 2025-01-14 PROCEDURE — 96376 TX/PRO/DX INJ SAME DRUG ADON: CPT

## 2025-01-14 PROCEDURE — 2500000003 HC RX 250 WO HCPCS: Performed by: NURSE PRACTITIONER

## 2025-01-14 PROCEDURE — 93970 EXTREMITY STUDY: CPT

## 2025-01-14 PROCEDURE — 6370000000 HC RX 637 (ALT 250 FOR IP): Performed by: NURSE PRACTITIONER

## 2025-01-14 PROCEDURE — APPNB60 APP NON BILLABLE TIME 46-60 MINS: Performed by: NURSE PRACTITIONER

## 2025-01-14 PROCEDURE — 96374 THER/PROPH/DIAG INJ IV PUSH: CPT

## 2025-01-14 RX ORDER — TRAMADOL HYDROCHLORIDE 50 MG/1
50-100 TABLET ORAL EVERY 6 HOURS PRN
Qty: 30 TABLET | Refills: 0 | Status: SHIPPED | OUTPATIENT
Start: 2025-01-14 | End: 2025-01-21

## 2025-01-14 RX ORDER — METHYLPREDNISOLONE 4 MG/1
TABLET ORAL
Qty: 1 KIT | Refills: 0 | Status: SHIPPED | OUTPATIENT
Start: 2025-01-14 | End: 2025-01-20

## 2025-01-14 RX ORDER — CYCLOBENZAPRINE HCL 10 MG
10 TABLET ORAL EVERY 12 HOURS PRN
Qty: 20 TABLET | Refills: 0 | Status: SHIPPED | OUTPATIENT
Start: 2025-01-14 | End: 2025-01-24

## 2025-01-14 RX ADMIN — FAMOTIDINE 20 MG: 20 TABLET, FILM COATED ORAL at 08:52

## 2025-01-14 RX ADMIN — DEXAMETHASONE SODIUM PHOSPHATE 10 MG: 10 INJECTION, SOLUTION INTRAMUSCULAR; INTRAVENOUS at 00:16

## 2025-01-14 RX ADMIN — Medication 2000 UNITS: at 08:53

## 2025-01-14 RX ADMIN — DEXAMETHASONE SODIUM PHOSPHATE 10 MG: 10 INJECTION, SOLUTION INTRAMUSCULAR; INTRAVENOUS at 08:53

## 2025-01-14 RX ADMIN — SODIUM CHLORIDE, PRESERVATIVE FREE 10 ML: 5 INJECTION INTRAVENOUS at 08:53

## 2025-01-14 NOTE — DISCHARGE SUMMARY
Orthopedic Service Discharge Summary    Patient ID:  Anny Lara  [id number]  female  39 y.o.  1985    Admit date: 1/13/2025    Discharge date and time: Mid day, 1/14/2025    Admitting Physician: Pollo Cuevas MD     Discharge Physician: Pollo Cuevas MD    Consulting Physician(s): Treatment Team:   Pollo Cuevas MD Crowl, Adam, MD Basey, [place], Yeimy Pope Shelly    Date of Surgery: 1/13/2025     Preoperative Diagnosis:  DDD (degenerative disc disease), cervical [M50.30]  Congenital cervical spine stenosis [Q76.49]    Postoperative Diagnosis: Post-Op Diagnosis Codes:     * DDD (degenerative disc disease), cervical [M50.30]     * Congenital cervical spine stenosis [Q76.49]     Procedure(s): Procedure(s):  C3-C7 ANTERIOR CERVICAL DISCECTOMY AND FUSION WITH INSTRUMENTATION    Surgeon: Surgeons and Role:     * Pollo Cuevas MD - Primary      Anesthesia:  General    Preoperative Medical Clearance:                           HPI:  Pt is a 39 y.o. female who has a history of DDD (degenerative disc disease), cervical [M50.30]  Congenital cervical spine stenosis [Q76.49]  S/P cervical spinal fusion [Z98.1]  with pain and limitations of activities of daily living who presents at this time with LUE weakness and numbness. 3/5 WE, 4/5 bicep, delt  Patient failed to achieve relief despite conservative management and wishes to proceed with surgery.     PMH:   Past Medical History:   Diagnosis Date    COVID     x2    Subclinical hyperthyroidism 07/02/2020    resolved as of 2022    Thyroid function test abnormal 7/2/2020    Tinea versicolor        Medications upon admission :   Prior to Admission Medications   Prescriptions Last Dose Informant Patient Reported? Taking?   vitamin D 50 MCG (2000 UT) CAPS capsule 1/11/2025  Yes Yes   Sig: Take 1 capsule by mouth daily      Facility-Administered Medications: None      Allergies:    Allergies   Allergen Reactions    Acetaminophen Itching    Amoxicillin Hives

## 2025-01-14 NOTE — PROGRESS NOTES
ORTHOPAEDIC CERVICAL FUSION PROGRESS NOTE    NAME:     Anny Lara   :       1985   MRN:       [id number]   DATE:      2025    POD:              1 Day Post-Op  S/P:              Procedure(s):  C3-C7 ANTERIOR CERVICAL DISCECTOMY AND FUSION WITH INSTRUMENTATION    SUBJECTIVE:  Patient states she feels well and would like to be discharged home now  C/O sore throat. Ate eggs and sausage for breakfast without issue.  Reports improvement in preop left arm discomfort, numbness/tingling  Postop pain controlled   Tolerating PO intake   Ambulatory   Voiding without difficulty   Denies nausea/vomiting, headache, chest pain or shortness of breath    No results for input(s): \"HGB\", \"HCT\", \"INR\", \"NA\", \"K\", \"CL\", \"CO2\", \"BUN\", \"GLU\" in the last 72 hours.    Invalid input(s): \"CREA\"  Patient Vitals for the past 12 hrs:   BP Temp Temp src Pulse Resp SpO2   25 1007 116/77 97.7 °F (36.5 °C) Oral 73 16 97 %   25 0528 126/82 98.1 °F (36.7 °C) -- 80 -- 98 %   25 0015 107/82 97.9 °F (36.6 °C) Oral 79 15 97 %     Exam:  Positive strength/ROM bilat upper ext.  Neuro intact to sensation  Dressings clean and dry  VISTA collar inplace / intact  Hemovac: 0ml output   Lower extremities nontender, soft. Legs without warmth, redness. Generalized swelling bilateral knee.     Lower Extremity Venous Findings    Right Lower Venous    No evidence of deep vein or superficial vein thrombosis. The common femoral, saphenofemoral junction, profunda femoral, femoral, popliteal, greater saphenous, and small saphenous veins were imaged in the transverse view and showed normal compressibility. The common femoral, popliteal, and middle femoral veins were imaged in the longitudinal view and showed normal color filling and normal phasic and spontaneous flow.   Left Lower Venous    No evidence of deep vein or superficial vein thrombosis. The common femoral, saphenofemoral junction, profunda femoral, femoral, popliteal, greater

## 2025-01-14 NOTE — PLAN OF CARE
Problem: Pain  Goal: Verbalizes/displays adequate comfort level or baseline comfort level  1/14/2025 1026 by Falguni Gibbs RN  Outcome: Adequate for Discharge  1/14/2025 1026 by Falguni Gibbs RN  Outcome: Progressing  1/14/2025 0249 by Sarah Ruiz RN  Outcome: Progressing     Problem: Safety - Adult  Goal: Free from fall injury  1/14/2025 1026 by Falguni Gibbs RN  Outcome: Adequate for Discharge  1/14/2025 1026 by Falguni Gibbs RN  Outcome: Progressing  1/14/2025 0249 by Sarah Ruiz RN  Outcome: Progressing     Problem: Discharge Planning  Goal: Discharge to home or other facility with appropriate resources  1/14/2025 1026 by Falguni Gibbs RN  Outcome: Adequate for Discharge  1/14/2025 1026 by Falguni Gibbs RN  Outcome: Progressing  1/14/2025 0249 by Sarah Ruiz RN  Outcome: Progressing

## 2025-01-14 NOTE — PLAN OF CARE
Problem: Pain  Goal: Verbalizes/displays adequate comfort level or baseline comfort level  1/14/2025 1026 by Falguni Gibbs RN  Outcome: Progressing  1/14/2025 0249 by Sarah Ruiz RN  Outcome: Progressing     Problem: Safety - Adult  Goal: Free from fall injury  1/14/2025 1026 by Falguni Gibbs RN  Outcome: Progressing  1/14/2025 0249 by Sarah Ruiz RN  Outcome: Progressing     Problem: Discharge Planning  Goal: Discharge to home or other facility with appropriate resources  1/14/2025 1026 by Falguni Gibbs RN  Outcome: Progressing  1/14/2025 0249 by Sarah Ruiz RN  Outcome: Progressing

## 2025-01-14 NOTE — DISCHARGE INSTRUCTIONS
directed.  Do NOT take additional Tylenol if your prescribed pain medication has acetaminophen in it (Endocet/Percocet, Lortab, Norco). You may sub  It is important to have regular bowel movements.  Pain medications may cause constipation.  Stool softeners, prune juice, and increasing your water and fiber intake may help in preventing constipation.  Do NOT take laxatives if at all possible except in severe situations. It can results in a vicious cycle of constipation and diarrhea.   Do not be alarmed if you still have some of the same symptoms you had prior to surgery. The nerves often require time to heal after the pressure has been relieved. You may experience pain in your shoulders or between your shoulder blades, which is common after this surgery. The level of pain you experience should improve as your body heals.      Driving  You may not drive or return to work until instructed by your physician.  However, you may ride in the car for short periods of time.    Neck collar  Wear your neck brace. You may remove it for short breaks, when eating or showering. You must keep the brace on while sleeping and when ambulating.    Showering  You may shower from shoulders down once you go home, wait for approximately 5 days after your surgery to shower head to toe and only if your incision is not draining.    You may remove your brace during showers.  Do not rub or apply lotion or ointments to the incision site.  Do not use tub baths, swimming pools or Jacuzzi’s.    Caring for your incision  Keep the dressing on until 7 days after surgery. At that point, if the incision is dry and without drainage, you may keep the wound open to air without cover.   You may have steri-strips on your incision (small, white pieces of tape).  Do not pull the steri-strips; they will fall off on their own after several days.  If you have sutures or staples, they will be removed by home health or when you see your physician.  Do not rub or

## 2025-01-14 NOTE — PROGRESS NOTES
ORTHOPAEDIC CERVICAL FUSION PROGRESS NOTE    NAME:     Anny Lara   :       1985   MRN:       [id number]   DATE:      2025    POD:              1 Day Post-Op  S/P:              Procedure(s):  C3-C7 ANTERIOR CERVICAL DISCECTOMY AND FUSION WITH INSTRUMENTATION    SUBJECTIVE:  C/O sore throat, swallowing without difficulty   Reports improvement in preop left arm discomfort, numbness/tingling  Postop pain controlled   Tolerating PO intake   Ambulatory   Voiding without difficulty   Complains of bilateral pain (as of early this AM) in the thigh/knee region, swelling (L>R) in the similar region.   Denies nausea/vomiting, headache, chest pain or shortness of breath  No results for input(s): \"HGB\", \"HCT\", \"INR\", \"NA\", \"K\", \"CL\", \"CO2\", \"BUN\", \"GLU\" in the last 72 hours.    Invalid input(s): \"CREA\"  Patient Vitals for the past 12 hrs:   BP Temp Temp src Pulse Resp SpO2   25 0528 126/82 98.1 °F (36.7 °C) -- 80 -- 98 %   25 0015 107/82 97.9 °F (36.6 °C) Oral 79 15 97 %   25 107/74 98.2 °F (36.8 °C) Oral 86 18 96 %     Exam:  Positive strength/ROM bilat upper ext.  Neuro intact to sensation  Dressings clean and dry  VISTA collar inplace / intact  Hemovac: 0ml output   Lower extremities nontender, soft. Legs without warmth, redness. Generalized swelling bilateral knee. Palpable anterior tibial and dorsalis pedis.     PLAN: 1 Day Post-Op, improved   Continue PO pain medications as needed  Continue SCD's, frequent ambulation  Advance to regular diet  Continue cervical orthosis  Continue Vit D3  R/O DVT, low suspicion. Bilateral vascular duplex ordered.   D/C to home today with Tramadol, PRN steroid.     TIM Gabriel - NP      Patient seen and examined this morning is doing great.  No pain.  She reports some mild upper back soreness.  Preoperative arm pain completely resolved, numbness and hypersensitivity from the elbows to the hands is resolved with the exception of some occasional

## 2025-01-14 NOTE — PROGRESS NOTES
Rounded on patient, f/u from Spine Pre-op Patient Education Class.   Patient states class information was valuable in preparing for surgery.   Patient states their home space is prepared and safe.   Reviewed incentive spirometry use   Call, don't fall expectations reviewed with patient  Reviewed plan of care with patient, including pain management, positioning, mobility precautions.  Opportunity provided for patient to ask questions and provide comments.  Questions answered.   at bedside

## 2025-01-14 NOTE — CARE COORDINATION
1/14/2025  9:20 AM      ICD-10-CM    1. Swelling of both lower extremities  M79.89 Vascular duplex lower extremity venous bilateral     Vascular duplex lower extremity venous bilateral     CANCELED: Vascular duplex lower extremity venous bilateral     CANCELED: Vascular duplex lower extremity venous bilateral      2. S/P cervical spinal fusion  Z98.1 Vascular duplex lower extremity venous bilateral     Vascular duplex lower extremity venous bilateral     CANCELED: Vascular duplex lower extremity venous bilateral     CANCELED: Vascular duplex lower extremity venous bilateral          General Risk Score: 2   Values used to calculate this score:    Points  Metrics       0        Age: 39       1        Hospital Admissions: 1       1        ED Visits: 1       0        Has Chronic Obstructive Pulmonary Disease: No       0        Has Diabetes: No       0        Has Congestive Heart Failure: No       0        Has Liver Disease: No       0        Has Depression: No       0        Current PCP: TIM Hoang NP       0        Has Medicaid: No      CM reviewed EMR. No CM needs indicated at this time. Per IDR, estimated discharge date is today. Providers, if needs arise, please consult case management.        01/14/25 0920   Services At/After Discharge   Services At/After Discharge None   Mode of Transport at Discharge Other (see comment)   Confirm Follow Up Transport Family

## 2025-01-16 LAB
HBV SURFACE AG SER QL: 0.24 INDEX
HBV SURFACE AG SER QL: NEGATIVE
HCV AB SER IA-ACNC: 0.18 INDEX
HCV AB SERPL QL IA: NONREACTIVE
HIV1 P24 AG SERPL QL IA: NONREACTIVE
HIV1+2 AB SERPL QL IA: NONREACTIVE

## 2025-07-21 ENCOUNTER — OFFICE VISIT (OUTPATIENT)
Age: 40
End: 2025-07-21
Payer: MEDICAID

## 2025-07-21 VITALS
DIASTOLIC BLOOD PRESSURE: 70 MMHG | SYSTOLIC BLOOD PRESSURE: 102 MMHG | BODY MASS INDEX: 21.15 KG/M2 | OXYGEN SATURATION: 99 % | WEIGHT: 131 LBS | HEART RATE: 60 BPM | RESPIRATION RATE: 16 BRPM

## 2025-07-21 DIAGNOSIS — Z12.4 ENCOUNTER FOR SCREENING FOR MALIGNANT NEOPLASM OF CERVIX: ICD-10-CM

## 2025-07-21 DIAGNOSIS — Z01.419 GYNECOLOGIC EXAM NORMAL: Primary | ICD-10-CM

## 2025-07-21 PROCEDURE — 99395 PREV VISIT EST AGE 18-39: CPT | Performed by: OBSTETRICS & GYNECOLOGY

## 2025-07-21 SDOH — ECONOMIC STABILITY: FOOD INSECURITY: WITHIN THE PAST 12 MONTHS, THE FOOD YOU BOUGHT JUST DIDN'T LAST AND YOU DIDN'T HAVE MONEY TO GET MORE.: NEVER TRUE

## 2025-07-21 SDOH — ECONOMIC STABILITY: FOOD INSECURITY: WITHIN THE PAST 12 MONTHS, YOU WORRIED THAT YOUR FOOD WOULD RUN OUT BEFORE YOU GOT MONEY TO BUY MORE.: NEVER TRUE

## 2025-07-21 ASSESSMENT — PATIENT HEALTH QUESTIONNAIRE - PHQ9
1. LITTLE INTEREST OR PLEASURE IN DOING THINGS: NOT AT ALL
2. FEELING DOWN, DEPRESSED OR HOPELESS: NOT AT ALL
SUM OF ALL RESPONSES TO PHQ QUESTIONS 1-9: 0

## 2025-07-21 NOTE — PROGRESS NOTES
Anny Lara is a 40 y.o. female, , Patient's last menstrual period was 2025., who presents today for the following:  Chief Complaint   Patient presents with   • Annual Exam        Allergies   Allergen Reactions   • Acetaminophen Itching   • Amoxicillin Hives   • Ibuprofen Itching   • Nsaids Itching   • Penicillins Hives   • Hydrocodone Hives       Current Outpatient Medications   Medication Sig Dispense Refill   • vitamin D 50 MCG (2000) CAPS capsule Take 1 capsule by mouth daily       No current facility-administered medications for this visit.         Past Medical History:   Diagnosis Date   • COVID     x2   • Subclinical hyperthyroidism 2020    resolved as of    • Thyroid function test abnormal 2020   • Tinea versicolor        Past Surgical History:   Procedure Laterality Date   • BUNIONECTOMY Left     Left Great Toe   • CERVICAL FUSION N/A 2025    C3-C7 ANTERIOR CERVICAL DISCECTOMY AND FUSION WITH INSTRUMENTATION performed by Pollo Cuevas MD at [facility] MAIN OR   • TUBAL LIGATION  2020   • WISDOM TOOTH EXTRACTION         Family History   Problem Relation Age of Onset   • Hypertension Father        Social History     Socioeconomic History   • Marital status: Single     Spouse name: Not on file   • Number of children: Not on file   • Years of education: Not on file   • Highest education level: Not on file   Occupational History   • Not on file   Tobacco Use   • Smoking status: Former     Current packs/day: 0.25     Types: Cigarettes   • Smokeless tobacco: Never   • Tobacco comments:     Stopped smoking ; had been smoking almost 1 ppd before stopping; previously only about 1/2 ppd since    Vaping Use   • Vaping status: Former   Substance and Sexual Activity   • Alcohol use: Not Currently     Alcohol/week: 0.0 - 1.0 standard drinks of alcohol   • Drug use: Never   • Sexual activity: Yes     Partners: Male   Other Topics Concern   • Not on file   Social History

## 2025-07-23 LAB
., LABCORP: NORMAL
CYTOLOGIST CVX/VAG CYTO: NORMAL
CYTOLOGY CVX/VAG DOC CYTO: NORMAL
CYTOLOGY CVX/VAG DOC THIN PREP: NORMAL
DX ICD CODE: NORMAL
Lab: NORMAL
OTHER STN SPEC: NORMAL
SERVICE CMNT-IMP: NORMAL
STAT OF ADQ CVX/VAG CYTO-IMP: NORMAL

## (undated) DEVICE — SOLUTION IRRIG 1000ML 0.9% SOD CHL USP POUR PLAS BTL

## (undated) DEVICE — KIT EVACUATOR 7FR 400CC PVC RADIOPAQUE

## (undated) DEVICE — DRAPE,REIN 53X77,STERILE: Brand: MEDLINE

## (undated) DEVICE — SYRINGE MED 10ML LUERLOCK TIP W/O SFTY DISP

## (undated) DEVICE — GLOVE SURG SZ 85 L12IN THK75MIL DK GRN LTX FREE

## (undated) DEVICE — SUTURE MONOCRYL STRATAFIX SPRL + SZ 3-0 L24IN ABSRB UD PS-2 SXMP1B108

## (undated) DEVICE — 4.0MM PRECISION ROUND, DISTAL BEND: Brand: IBUR

## (undated) DEVICE — SPONGE,PEANUT,XRAY,ST,SM,3/8",5/CARD: Brand: MEDLINE INDUSTRIES, INC.

## (undated) DEVICE — APPLICATOR MEDICATED 10.5 CC SOLUTION HI LT ORNG CHLORAPREP

## (undated) DEVICE — TOBRA BONE BASKET- AUTOGRAFT BONE COLLECTION SYSTEM WITH MESH FILTER AND GRAFT COMPRESSOR: Brand: TOBRA BONE BASKET

## (undated) DEVICE — AGENT HEMOSTATIC SURGIFLOW MATRIX KIT W/THROMBIN

## (undated) DEVICE — CANISTER, RIGID, 3000CC: Brand: MEDLINE INDUSTRIES, INC.

## (undated) DEVICE — SPONGE GZ W4XL4IN COT RADPQ HIGHLY ABSRB STERILE

## (undated) DEVICE — GOWN,SIRUS,NONRNF,SETINSLV,2XL,18/CS: Brand: MEDLINE

## (undated) DEVICE — SKIN PREP TRAY 4 COMPARTM TRAY: Brand: MEDLINE INDUSTRIES, INC.

## (undated) DEVICE — SCREW EXT FIX L12MM FOR DISTRCTN

## (undated) DEVICE — GLOVE SURG SZ 7 L12IN FNGR THK79MIL GRN LTX FREE

## (undated) DEVICE — AEGIS 1" DISK 4MM HOLE, PEEL OPEN: Brand: MEDLINE

## (undated) DEVICE — 1LYRTR 16FR10ML100%SILTMPS SNP: Brand: MEDLINE INDUSTRIES, INC.

## (undated) DEVICE — SUTURE VICRYL + SZ 3-0 L27IN ABSRB UD L26MM SH 1/2 CIR VCP416H

## (undated) DEVICE — DRAPE,LAPAROTOMY,PED,STERILE: Brand: MEDLINE

## (undated) DEVICE — DRAPE MICSCP W46XL120IN FOR ZEISS MD FEATURING CLEARLENS

## (undated) DEVICE — SPONGE,NEURO,0.5"X0.5",XR,STRL,10/PK: Brand: MEDLINE

## (undated) DEVICE — CLEANER ES TIP W2XL2IN ADH BK RADPQ FOR S STL ELECTRD

## (undated) DEVICE — AGENT HEMSTAT 3GM OXIDIZED REGENERATED CELOS ABSRB FOR CONT (ORDER MULTIPLES OF 5EA)

## (undated) DEVICE — ALCOHOL RUBBING ISO 16OZ 70%

## (undated) DEVICE — PAD CERV CLLR REPL FOR VISTA

## (undated) DEVICE — 3M™ TEGADERM™ TRANSPARENT FILM DRESSING FRAME STYLE, 1624W, 2-3/8 IN X 2-3/4 IN (6 CM X 7 CM), 100/CT 4CT/CASE: Brand: 3M™ TEGADERM™

## (undated) DEVICE — MARKER SKIN PREP-RESISTANT ST: Brand: MEDLINE INDUSTRIES, INC.

## (undated) DEVICE — SPONGE GZ W4XL4IN COT 12 PLY TYP VII WVN C FLD DSGN STERILE

## (undated) DEVICE — GLOVE SURG SZ 65 THK91MIL LTX FREE SYN POLYISOPRENE

## (undated) DEVICE — GLOVE SURG SZ 75 L12IN FNGR THK79MIL GRN LTX FREE

## (undated) DEVICE — TRAP,MUCUS SPECIMEN, 80CC: Brand: MEDLINE

## (undated) DEVICE — STRAP,POSITIONING,KNEE/BODY,FOAM,4X60": Brand: MEDLINE

## (undated) DEVICE — KIT ARMOR C DRP COLLAPSIBLE AND SELF EXP TOP CVR FOR FLUOROSCOPIC

## (undated) DEVICE — DRESSING BORDERED ADH GZ UNIV GEN USE 8INX4IN AND 6INX2IN

## (undated) DEVICE — SOLUTION IRRIG 1000ML STRL H2O USP PLAS POUR BTL

## (undated) DEVICE — LIQUIBAND RAPID ADHESIVE 36/CS 0.8ML: Brand: MEDLINE

## (undated) DEVICE — CERVICAL-SFMC: Brand: MEDLINE INDUSTRIES, INC.

## (undated) DEVICE — 3.0MM PRECISION MATCH HEAD, DISTAL BEND: Brand: IBUR

## (undated) DEVICE — Device

## (undated) DEVICE — Device: Brand: JELCO

## (undated) DEVICE — GLOVE ORANGE PI 7 1/2   MSG9075

## (undated) DEVICE — TAPE,CLOTH/SILK,CURAD,3"X10YD,LF,40/CS: Brand: CURAD

## (undated) DEVICE — SUTURE PERMAHAND SZ 3-0 L30IN NONABSORBABLE BLK SILK BRAID A304H

## (undated) DEVICE — SCREW EXT FIX L14MM FOR DISTRCTN